# Patient Record
Sex: FEMALE | Race: BLACK OR AFRICAN AMERICAN | NOT HISPANIC OR LATINO | Employment: FULL TIME | URBAN - METROPOLITAN AREA
[De-identification: names, ages, dates, MRNs, and addresses within clinical notes are randomized per-mention and may not be internally consistent; named-entity substitution may affect disease eponyms.]

---

## 2023-05-25 ENCOUNTER — OFFICE VISIT (OUTPATIENT)
Dept: URGENT CARE | Age: 53
End: 2023-05-25

## 2023-05-25 VITALS
TEMPERATURE: 96.1 F | WEIGHT: 210 LBS | HEART RATE: 76 BPM | BODY MASS INDEX: 33.75 KG/M2 | OXYGEN SATURATION: 99 % | SYSTOLIC BLOOD PRESSURE: 140 MMHG | DIASTOLIC BLOOD PRESSURE: 84 MMHG | RESPIRATION RATE: 18 BRPM | HEIGHT: 66 IN

## 2023-05-25 DIAGNOSIS — H57.12 ACUTE LEFT EYE PAIN: Primary | ICD-10-CM

## 2023-05-25 DIAGNOSIS — H57.89 REDNESS OF LEFT EYE: ICD-10-CM

## 2023-05-25 PROBLEM — D25.9 FIBROID UTERUS: Status: ACTIVE | Noted: 2023-05-25

## 2023-05-25 NOTE — PROGRESS NOTES
[facility]        NAME: Keeley Simons is a 46 y o  female  : 1970    MRN: [id number]  DATE: May 25, 2023  TIME: 7:27 PM      Assessment and Plan     Acute left eye pain [H57 12]  1  Acute left eye pain  Transfer to other facility      2  Redness of left eye  Transfer to other facility          Patient agreeable to proceed to the ER for further evaluation- requesting to go to [facility]  Family at bedside  Patient Instructions     Proceed to the ER for further evaluation  Chief Complaint     Chief Complaint   Patient presents with   • Eye Swelling     Left eye started to itch and then swelled started today          History of Present Illness     Patient is a 65-year-old female who presents with left eye pain that started two hours ago  Reports a deep, severe pain to the eye  States it started acutely after dinner  Reports swelling to left eyelid  Denies visual disturbances  Denies fever  Denies headache or dizziness  Denies vomiting or diarrhea  Denies trauma to her eye  Denies use of contacts  Review of Systems     Review of Systems   Constitutional: Negative for chills and fever  Eyes: Positive for pain, redness and itching  Negative for discharge and visual disturbance  Gastrointestinal: Negative for diarrhea, nausea and vomiting  Neurological: Negative for headaches  All other systems reviewed and are negative  Current Medications     No current outpatient medications on file  Current Allergies     Allergies as of 2023 - Reviewed 2023   Allergen Reaction Noted   • Penicillins Rash 2013              The following portions of the patient's history were reviewed and updated as appropriate: allergies, current medications, past family history, past medical history, past social history, past surgical history and problem list      No past medical history on file  No past surgical history on file  No family history on file        Medications have been "verified  Objective     /84   Pulse 76   Temp (!) 96 1 °F (35 6 °C)   Resp 18   Ht 5' 6\" (1 676 m)   Wt 95 3 kg (210 lb)   SpO2 99%   BMI 33 89 kg/m²   No LMP recorded  Physical Exam     Physical Exam  Vitals and nursing note reviewed  Constitutional:       General: She is awake  She is not in acute distress  Appearance: Normal appearance  She is not ill-appearing, toxic-appearing or diaphoretic  Eyes:      General:         Right eye: No discharge  Left eye: Discharge (clear, watery) present  Conjunctiva/sclera:      Right eye: Right conjunctiva is not injected  Left eye: Left conjunctiva is injected  No chemosis, exudate or hemorrhage  Pupils: Pupils are equal, round, and reactive to light  Funduscopic exam:     Right eye: Red reflex present  Left eye: Red reflex present  Comments: Swelling to left upper and lower eyelid with associated redness  Cardiovascular:      Rate and Rhythm: Normal rate  Pulses: Normal pulses  Heart sounds: Normal heart sounds, S1 normal and S2 normal    Pulmonary:      Effort: Pulmonary effort is normal       Breath sounds: Normal breath sounds and air entry  Skin:     General: Skin is warm  Capillary Refill: Capillary refill takes less than 2 seconds  Neurological:      Mental Status: She is alert  Psychiatric:         Mood and Affect: Mood normal          Behavior: Behavior normal          Thought Content:  Thought content normal          Judgment: Judgment normal        "

## 2023-06-30 ENCOUNTER — HOSPITAL ENCOUNTER (OUTPATIENT)
Dept: RADIOLOGY | Facility: IMAGING CENTER | Age: 53
End: 2023-06-30
Payer: COMMERCIAL

## 2023-06-30 DIAGNOSIS — M25.571 PAIN, JOINT, ANKLE AND FOOT, RIGHT: ICD-10-CM

## 2023-06-30 PROCEDURE — 73610 X-RAY EXAM OF ANKLE: CPT

## 2023-08-03 ENCOUNTER — OFFICE VISIT (OUTPATIENT)
Dept: INTERNAL MEDICINE CLINIC | Facility: OTHER | Age: 53
End: 2023-08-03
Payer: COMMERCIAL

## 2023-08-03 VITALS
OXYGEN SATURATION: 98 % | BODY MASS INDEX: 34.78 KG/M2 | TEMPERATURE: 98.1 F | RESPIRATION RATE: 18 BRPM | HEIGHT: 66 IN | SYSTOLIC BLOOD PRESSURE: 124 MMHG | DIASTOLIC BLOOD PRESSURE: 82 MMHG | HEART RATE: 77 BPM | WEIGHT: 216.4 LBS

## 2023-08-03 DIAGNOSIS — Z00.00 ENCOUNTER FOR ANNUAL PHYSICAL EXAM: ICD-10-CM

## 2023-08-03 DIAGNOSIS — M79.605 PAIN IN LEFT LEG: Primary | ICD-10-CM

## 2023-08-03 PROCEDURE — 99202 OFFICE O/P NEW SF 15 MIN: CPT

## 2023-08-03 RX ORDER — PREDNISONE 20 MG/1
20 TABLET ORAL DAILY
COMMUNITY
Start: 2023-07-25

## 2023-08-03 NOTE — PROGRESS NOTES
[facility]  INTERNAL MEDICINE        NAME: Jo Guerrero  AGE: 46 y.o. SEX: female    DATE OF ENCOUNTER: 8/3/2023    ASSESSMENT AND PLAN     1. Pain in left leg  · Reports pain in entire left leg that has been going on for almost 2 years. Pain is intermittent, cramping and sometimes burning sensation but otherwise is not associated with any other systemic symptoms. Denies any trauma or fall. Mentions she had some " stress test" done while in [place] (report not available at this time). Denies any other systemic symptoms including leg swelling, fever, rash, discoloration. Has family history of arthritis. · On physical exam, some crepitus noted on bilateral knees but otherwise fairly normal physical findings. · Likely arthritis versus DVT or PVD. · Will order x-ray of left hip/pelvis and left knee to rule out arthritis. · Follow-up in 4 to 6 weeks. - XR hip/pelv 2-3 vws left if performed; Future  - XR knee 3 vw left non injury; Future    No orders of the defined types were placed in this encounter. CHIEF COMPLAINT     Chief Complaint   Patient presents with   • Establish Care   • hm     Mammo,( REFUSED ) cologuard ( REFUSED ), phq, annual, bmi f/u plan   • Leg Pain     Left leg continued pain, wakes her up at night did have testing yrs ago        HISTORY OF PRESENT ILLNESS     58-year-old female fairly healthy with no significant past medical history who presents today to the clinic to establish care. Today, she complains of pain in left leg that has been going on for almost 2 years. Reports pain in entire left leg that has been going on for almost 2 years. Pain is intermittent, cramping and sometimes burning sensation but otherwise is not associated with any other systemic symptoms. Denies any trauma or fall. Mentions she had some " stress test" done while in [place] (report not available at this time).   Denies any other systemic symptoms including leg swelling, fever, rash, discoloration. Has family history of arthritis. She otherwise feels in good health with no other physical complaints. She is following podiatry for right foot pain and had recent x-ray which showed spur. She recently received steroid injection to her right foot with much relief. The following portions of the patient's history were reviewed and updated as appropriate: allergies, current medications, past family history, past medical history, past social history, past surgical history and problem list.    REVIEW OF SYSTEMS     Review of Systems   Constitutional: Negative for chills and fever. HENT: Negative for congestion and sore throat. Cardiovascular: Negative for chest pain and dyspnea on exertion. Respiratory: Negative for cough and shortness of breath. Skin: Negative for color change, itching and unusual hair distribution. Musculoskeletal: Positive for muscle cramps. Gastrointestinal: Negative for abdominal pain, nausea and vomiting. Genitourinary: Negative for frequency and urgency. All other systems reviewed and are negative. All other ROS negative except per HPI    ACTIVE PROBLEM LIST     Patient Active Problem List   Diagnosis   • S/P hysterectomy   • Fibroid uterus       History reviewed. No pertinent past medical history. CURRENT MEDICATIONS       Current Outpatient Medications:   •  predniSONE 20 mg tablet, Take 20 mg by mouth daily, Disp: , Rfl:   •  Probiotic Product (PROBIOTIC DAILY PO), Take by mouth if needed, Disp: , Rfl:     Allergies   Allergen Reactions   • Penicillins Rash       Social History   History reviewed. No pertinent surgical history. Family History   Problem Relation Age of Onset   • Arthritis Mother    • Hypertension Father    • Asthma Father    • Glaucoma Father        OBJECTIVE     Resp 18   Ht 5' 5.5" (1.664 m)   Wt 98.2 kg (216 lb 6.4 oz)   BMI 35.46 kg/m²     Physical Exam  Vitals reviewed.    Constitutional:       Appearance: Normal appearance. She is obese. HENT:      Head: Normocephalic and atraumatic. Cardiovascular:      Rate and Rhythm: Normal rate and regular rhythm. Pulses: Normal pulses. Heart sounds: Normal heart sounds. Pulmonary:      Effort: Pulmonary effort is normal.      Breath sounds: Normal breath sounds. Abdominal:      General: Bowel sounds are normal.      Palpations: Abdomen is soft. Musculoskeletal:         General: No swelling or deformity. Normal range of motion. Right lower leg: No edema. Left lower leg: No edema. Neurological:      General: No focal deficit present. Mental Status: She is alert and oriented to person, place, and time. Pertinent Laboratory/Diagnostic Studies:     XR ankle 3+ vw right    Result Date: 7/9/2023  Impression: No acute osseous abnormality. Workstation performed: [id number]       Images and diagnostics reviewed     [address] [place] [address]   Topic Date Due   • Hepatitis C Screening  Never done   • COVID-19 Vaccine (1) Never done   • HIV Screening  Never done   • BMI: Followup Plan  Never done   • Annual Physical  Never done   • DTaP,Tdap,and Td Vaccines (1 - Tdap) Never done   • Breast Cancer Screening: Mammogram  Never done   • Colorectal Cancer Screening  Never done   • Influenza Vaccine (1) 09/01/2023   • BMI: Adult  05/25/2024   • Depression Screening  08/03/2024   • Pneumococcal Vaccine: Pediatrics (0 to 5 Years) and At-Risk Patients (6 to 59 Years)  Aged Out   • HIB Vaccine  Aged Out   • IPV Vaccine  Aged Out   • Hepatitis A Vaccine  Aged Out   • Meningococcal ACWY Vaccine  Aged Out   • HPV Vaccine  Aged Out       There is no immunization history on file for this patient. I globally spent 35 minutes face-to-face with the patient and with chart review.      [address]ik Mack,   Internal Medicine PGY-2

## 2024-01-11 ENCOUNTER — OFFICE VISIT (OUTPATIENT)
Dept: URGENT CARE | Age: 54
End: 2024-01-11
Payer: COMMERCIAL

## 2024-01-11 VITALS
HEART RATE: 77 BPM | BODY MASS INDEX: 35.23 KG/M2 | RESPIRATION RATE: 20 BRPM | DIASTOLIC BLOOD PRESSURE: 86 MMHG | SYSTOLIC BLOOD PRESSURE: 132 MMHG | WEIGHT: 215 LBS | TEMPERATURE: 97.5 F | OXYGEN SATURATION: 99 %

## 2024-01-11 DIAGNOSIS — M54.32 SCIATICA OF LEFT SIDE: Primary | ICD-10-CM

## 2024-01-11 PROCEDURE — 99213 OFFICE O/P EST LOW 20 MIN: CPT

## 2024-01-11 RX ORDER — PREDNISONE 20 MG/1
40 TABLET ORAL DAILY
Qty: 8 TABLET | Refills: 0 | Status: SHIPPED | OUTPATIENT
Start: 2024-01-11 | End: 2024-01-15

## 2024-01-11 RX ORDER — NAPROXEN 500 MG/1
500 TABLET ORAL 2 TIMES DAILY WITH MEALS
Qty: 14 TABLET | Refills: 0 | Status: SHIPPED | OUTPATIENT
Start: 2024-01-11 | End: 2024-01-18

## 2024-01-11 NOTE — PROGRESS NOTES
[facility]        NAME: Blanche Kennedy is a 53 y.o. female  : 1970    MRN: [id number]  DATE: 2024  TIME: 4:34 PM    Assessment and Plan   Sciatica of left side [M54.32]  1. Sciatica of left side  predniSONE 20 mg tablet    naproxen (Naprosyn) 500 mg tablet        Today started with left low back pain with radiation into left buttock- no injury/trauma. Discussed sciatic pain- PCP f/u    Patient Instructions       Follow up with PCP in 3-5 days.  Proceed to  ER if symptoms worsen.    Chief Complaint     Chief Complaint   Patient presents with    Sciatica     Pt started today with left sided buttock pain with radiation down left leg. Has numbness. Taking Tylenol  and applied Salon pas for pain.           History of Present Illness       Today started with left low back pain with radiation into left buttock- no injury/trauma. Discussed sciatic pain- PCP f/u        Review of Systems   Review of Systems   Musculoskeletal:  Positive for back pain.         Current Medications       Current Outpatient Medications:     naproxen (Naprosyn) 500 mg tablet, Take 1 tablet (500 mg total) by mouth 2 (two) times a day with meals for 7 days, Disp: 14 tablet, Rfl: 0    predniSONE 20 mg tablet, Take 2 tablets (40 mg total) by mouth daily for 4 days, Disp: 8 tablet, Rfl: 0    Probiotic Product (PROBIOTIC DAILY PO), Take by mouth if needed (Patient not taking: Reported on 2024), Disp: , Rfl:     Current Allergies     Allergies as of 2024 - Reviewed 2024   Allergen Reaction Noted    Penicillins Rash 2013            The following portions of the patient's history were reviewed and updated as appropriate: allergies, current medications, past family history, past medical history, past social history, past surgical history and problem list.     History reviewed. No pertinent past medical history.    History reviewed. No pertinent surgical history.    Family History   Problem Relation Age of  Onset    Arthritis Mother     Hypertension Father     Asthma Father     Glaucoma Father          Medications have been verified.        Objective   /86   Pulse 77   Temp 97.5 °F (36.4 °C) (Tympanic)   Resp 20   Wt 97.5 kg (215 lb)   SpO2 99%   BMI 35.23 kg/m²   No LMP recorded. Patient has had a hysterectomy.       Physical Exam     Physical Exam  Vitals reviewed.   Constitutional:       Appearance: Normal appearance.   Musculoskeletal:         General: Tenderness present. No deformity or signs of injury.   Skin:     Findings: No bruising or erythema.   Neurological:      Mental Status: She is alert.

## 2024-01-11 NOTE — LETTER
January 11, 2024     Patient: Blanche Kennedy   YOB: 1970   Date of Visit: 1/11/2024       To Whom it May Concern:    Blanche Kennedy was seen in my clinic on 1/11/2024. She may return to work on 01/12/2024 .    If you have any questions or concerns, please don't hesitate to call.         Sincerely,          Gonzalo Harrison,         CC: No Recipients

## 2024-02-05 ENCOUNTER — HOSPITAL ENCOUNTER (OUTPATIENT)
Dept: RADIOLOGY | Facility: IMAGING CENTER | Age: 54
Discharge: HOME | End: 2024-02-05
Payer: COMMERCIAL

## 2024-02-05 ENCOUNTER — OFFICE VISIT (OUTPATIENT)
Dept: INTERNAL MEDICINE CLINIC | Facility: OTHER | Age: 54
End: 2024-02-05
Payer: COMMERCIAL

## 2024-02-05 VITALS
SYSTOLIC BLOOD PRESSURE: 110 MMHG | HEART RATE: 84 BPM | WEIGHT: 221.2 LBS | HEIGHT: 66 IN | DIASTOLIC BLOOD PRESSURE: 80 MMHG | TEMPERATURE: 98 F | BODY MASS INDEX: 35.55 KG/M2 | OXYGEN SATURATION: 97 %

## 2024-02-05 DIAGNOSIS — G89.29 CHRONIC MIDLINE LOW BACK PAIN WITH LEFT-SIDED SCIATICA: ICD-10-CM

## 2024-02-05 DIAGNOSIS — Z12.11 ENCOUNTER FOR COLORECTAL CANCER SCREENING: ICD-10-CM

## 2024-02-05 DIAGNOSIS — M54.42 ACUTE MIDLINE LOW BACK PAIN WITH LEFT-SIDED SCIATICA: ICD-10-CM

## 2024-02-05 DIAGNOSIS — E78.2 MIXED HYPERLIPIDEMIA: Primary | ICD-10-CM

## 2024-02-05 DIAGNOSIS — M54.42 CHRONIC MIDLINE LOW BACK PAIN WITH LEFT-SIDED SCIATICA: ICD-10-CM

## 2024-02-05 DIAGNOSIS — Z13.820 OSTEOPOROSIS SCREENING: ICD-10-CM

## 2024-02-05 DIAGNOSIS — Z12.31 ENCOUNTER FOR SCREENING MAMMOGRAM FOR MALIGNANT NEOPLASM OF BREAST: ICD-10-CM

## 2024-02-05 DIAGNOSIS — Z12.12 ENCOUNTER FOR COLORECTAL CANCER SCREENING: ICD-10-CM

## 2024-02-05 PROCEDURE — 99214 OFFICE O/P EST MOD 30 MIN: CPT | Performed by: INTERNAL MEDICINE

## 2024-02-05 PROCEDURE — 72110 X-RAY EXAM L-2 SPINE 4/>VWS: CPT

## 2024-02-05 NOTE — ASSESSMENT & PLAN NOTE
Continue follow-up with chiropractor and continue acupuncture therapy.  Will order an x-ray of the lumbar spine for further evaluation.

## 2024-02-05 NOTE — PROGRESS NOTES
Assessment/Plan:    Chronic midline low back pain with left-sided sciatica  Continue follow-up with chiropractor and continue acupuncture therapy.  Will order an x-ray of the lumbar spine for further evaluation.    Mixed hyperlipidemia  Discussed diet and exercise.  Continue to trend lipid panel.       Diagnoses and all orders for this visit:    Mixed hyperlipidemia  -     CBC; Future  -     Comprehensive metabolic panel; Future  -     Lipid panel; Future    Encounter for screening mammogram for malignant neoplasm of breast  -     Mammo screening bilateral w 3d & cad; Future    Encounter for colorectal cancer screening  -     Ambulatory Referral to Gastroenterology; Future    Osteoporosis screening  -     DXA bone density spine hip and pelvis; Future    Acute midline low back pain with left-sided sciatica  -     XR spine lumbar minimum 4 views non injury; Future    Chronic midline low back pain with left-sided sciatica                  Subjective:      Patient ID: Blanche Kennedy is a 53 y.o. female.    Chief Complaint   Patient presents with   • Follow-up     6 month follow up. Patient has been having left leg pain burning and throbbing starts in upper thigh radiates  down leg   • Health Screening     Colonoscopy  ordered mammogram ordered patient will call to schedule dexa scan ordered       Blanche Kennedy is seen today for follow up of chronic conditions.   Recent laboratory studies reviewed today with the patient.   she has been compliant with her medication regimen.   She continues to experiencing left leg pain that occurs predominantly at night when asleep and has been present for years. She sleeps on her stomach. The pain is characterized as thrombing  and burning.  She recently had an exacerbation of low back pain to which she has been seeing acupuncture therapist and chiropractor.  she has no complaints or concerns at this time.       Back Pain  This is a new problem. The current episode started 1 to 4 weeks  ago. The problem occurs intermittently. Pertinent negatives include no abdominal pain, chest pain, dysuria, fever, headaches, numbness or weakness.     The following portions of the patient's history were reviewed and updated as appropriate: allergies, current medications, past family history, past medical history, past social history, past surgical history, and problem list.    Review of Systems   Constitutional:  Negative for activity change, appetite change, chills, diaphoresis, fatigue and fever.   HENT:  Negative for congestion, postnasal drip, rhinorrhea, sinus pressure, sinus pain, sneezing and sore throat.    Eyes:  Negative for visual disturbance.   Respiratory:  Negative for apnea, cough, choking, chest tightness, shortness of breath and wheezing.    Cardiovascular:  Negative for chest pain, palpitations and leg swelling.   Gastrointestinal:  Negative for abdominal distention, abdominal pain, anal bleeding, blood in stool, constipation, diarrhea, nausea and vomiting.   Endocrine: Negative for cold intolerance and heat intolerance.   Genitourinary:  Negative for difficulty urinating, dysuria and hematuria.   Musculoskeletal:  Positive for back pain.   Skin: Negative.    Neurological:  Negative for dizziness, weakness, light-headedness, numbness and headaches.   Hematological:  Negative for adenopathy.   Psychiatric/Behavioral:  Negative for agitation, sleep disturbance and suicidal ideas.    All other systems reviewed and are negative.        History reviewed. No pertinent past medical history.    No current outpatient medications on file.    Allergies   Allergen Reactions   • Penicillins Rash       Social History   History reviewed. No pertinent surgical history.  Family History   Problem Relation Age of Onset   • Arthritis Mother    • Hypertension Father    • Asthma Father    • Glaucoma Father        Objective:  /80 (BP Location: Left arm, Patient Position: Sitting, Cuff Size: Large)   Pulse 84    "Temp 98 °F (36.7 °C) (Temporal)   Ht 5' 6\" (1.676 m)   Wt 100 kg (221 lb 3.2 oz)   SpO2 97%   BMI 35.70 kg/m²     No results found for this or any previous visit (from the past 1344 hour(s)).         Physical Exam  Vitals and nursing note reviewed.   Constitutional:       General: She is not in acute distress.     Appearance: She is well-developed. She is not diaphoretic.   HENT:      Head: Normocephalic and atraumatic.   Eyes:      General:         Right eye: No discharge.         Left eye: No discharge.      Conjunctiva/sclera: Conjunctivae normal.      Pupils: Pupils are equal, round, and reactive to light.   Neck:      Thyroid: No thyromegaly.      Vascular: No JVD.   Cardiovascular:      Rate and Rhythm: Normal rate and regular rhythm.      Heart sounds: Normal heart sounds. No murmur heard.     No friction rub. No gallop.   Pulmonary:      Effort: Pulmonary effort is normal. No respiratory distress.      Breath sounds: Normal breath sounds. No wheezing or rales.   Chest:      Chest wall: No tenderness.   Abdominal:      General: There is no distension.      Palpations: Abdomen is soft.      Tenderness: There is no abdominal tenderness.   Musculoskeletal:         General: No tenderness or deformity. Normal range of motion.      Cervical back: Normal range of motion and neck supple.   Lymphadenopathy:      Cervical: No cervical adenopathy.   Skin:     General: Skin is warm and dry.      Coloration: Skin is not pale.      Findings: No erythema or rash.   Neurological:      Mental Status: She is alert and oriented to person, place, and time.      Cranial Nerves: No cranial nerve deficit.      Coordination: Coordination normal.   Psychiatric:         Behavior: Behavior normal.         Thought Content: Thought content normal.         Judgment: Judgment normal.         "

## 2024-02-16 ENCOUNTER — TELEPHONE (OUTPATIENT)
Age: 54
End: 2024-02-16

## 2024-02-16 NOTE — TELEPHONE ENCOUNTER
Scheduled date of colonoscopy (as of today): Monday 4/15/2024  Physician performing colonoscopy:  DR. NDIAYE  Location of colonoscopy: [facility]  Bowel prep reviewed with patient: MIRALAX / DULCOLAX  Instructions reviewed with patient by: Dyllan MAYER / Sent via e-mail  Clearances: N/A

## 2024-02-16 NOTE — TELEPHONE ENCOUNTER
02/16/24  Screened by: Rin Fisher    Referring Provider PCP    Pre- Screening:     There is no height or weight on file to calculate BMI.  Has patient been referred for a routine screening Colonoscopy? yes  Is the patient between 45-75 years old? yes      Previous Colonoscopy yes   If yes:    Date: 10yrs    Facility: [place]    Reason: Screening      Does the patient want to see a Gastroenterologist prior to their procedure OR are they having any GI symptoms? no    Has the patient been hospitalized or had abdominal surgery in the past 6 months? no    Does the patient use supplemental oxygen? no    Does the patient take Coumadin, Lovenox, Plavix, Elliquis, Xarelto, or other blood thinning medication? no    Has the patient had a stroke, cardiac event, or stent placed in the past year? no      If patient is between 45yrs - 49yrs, please advise patient that we will have to confirm benefits & coverage with their insurance company for a routine screening colonoscopy.

## 2024-04-01 ENCOUNTER — ANESTHESIA (OUTPATIENT)
Dept: ANESTHESIOLOGY | Facility: HOSPITAL | Age: 54
End: 2024-04-01

## 2024-04-01 ENCOUNTER — ANESTHESIA EVENT (OUTPATIENT)
Dept: ANESTHESIOLOGY | Facility: HOSPITAL | Age: 54
End: 2024-04-01

## 2024-04-15 ENCOUNTER — HOSPITAL ENCOUNTER (OUTPATIENT)
Dept: GASTROENTEROLOGY | Facility: AMBULARY SURGERY CENTER | Age: 54
Setting detail: OUTPATIENT SURGERY
Discharge: HOME | End: 2024-04-15
Attending: INTERNAL MEDICINE | Admitting: INTERNAL MEDICINE
Payer: COMMERCIAL

## 2024-04-15 ENCOUNTER — TELEPHONE (OUTPATIENT)
Dept: GASTROENTEROLOGY | Facility: CLINIC | Age: 54
End: 2024-04-15

## 2024-04-15 ENCOUNTER — PREP FOR PROCEDURE (OUTPATIENT)
Dept: GASTROENTEROLOGY | Facility: AMBULARY SURGERY CENTER | Age: 54
End: 2024-04-15

## 2024-04-15 ENCOUNTER — ANESTHESIA EVENT (OUTPATIENT)
Dept: GASTROENTEROLOGY | Facility: AMBULARY SURGERY CENTER | Age: 54
End: 2024-04-15

## 2024-04-15 ENCOUNTER — ANESTHESIA (OUTPATIENT)
Dept: GASTROENTEROLOGY | Facility: AMBULARY SURGERY CENTER | Age: 54
End: 2024-04-15

## 2024-04-15 VITALS
OXYGEN SATURATION: 100 % | HEART RATE: 76 BPM | TEMPERATURE: 97.9 F | WEIGHT: 221 LBS | BODY MASS INDEX: 35.52 KG/M2 | HEIGHT: 66 IN | DIASTOLIC BLOOD PRESSURE: 69 MMHG | SYSTOLIC BLOOD PRESSURE: 117 MMHG | RESPIRATION RATE: 18 BRPM

## 2024-04-15 DIAGNOSIS — Z12.11 SCREENING FOR COLON CANCER: Primary | ICD-10-CM

## 2024-04-15 DIAGNOSIS — Z12.11 SCREENING FOR COLON CANCER: ICD-10-CM

## 2024-04-15 RX ORDER — PROPOFOL 10 MG/ML
INJECTION, EMULSION INTRAVENOUS AS NEEDED
Status: DISCONTINUED | OUTPATIENT
Start: 2024-04-15 | End: 2024-04-15

## 2024-04-15 RX ORDER — LIDOCAINE HYDROCHLORIDE 10 MG/ML
INJECTION, SOLUTION EPIDURAL; INFILTRATION; INTRACAUDAL; PERINEURAL AS NEEDED
Status: DISCONTINUED | OUTPATIENT
Start: 2024-04-15 | End: 2024-04-15

## 2024-04-15 RX ORDER — SODIUM CHLORIDE, SODIUM LACTATE, POTASSIUM CHLORIDE, CALCIUM CHLORIDE 600; 310; 30; 20 MG/100ML; MG/100ML; MG/100ML; MG/100ML
INJECTION, SOLUTION INTRAVENOUS CONTINUOUS PRN
Status: DISCONTINUED | OUTPATIENT
Start: 2024-04-15 | End: 2024-04-15

## 2024-04-15 RX ADMIN — LIDOCAINE HYDROCHLORIDE 50 MG: 10 INJECTION, SOLUTION EPIDURAL; INFILTRATION; INTRACAUDAL at 07:39

## 2024-04-15 RX ADMIN — SODIUM CHLORIDE, SODIUM LACTATE, POTASSIUM CHLORIDE, AND CALCIUM CHLORIDE: .6; .31; .03; .02 INJECTION, SOLUTION INTRAVENOUS at 07:33

## 2024-04-15 RX ADMIN — PROPOFOL 100 MG: 10 INJECTION, EMULSION INTRAVENOUS at 07:41

## 2024-04-15 RX ADMIN — PROPOFOL 50 MG: 10 INJECTION, EMULSION INTRAVENOUS at 07:45

## 2024-04-15 RX ADMIN — PROPOFOL 50 MG: 10 INJECTION, EMULSION INTRAVENOUS at 07:42

## 2024-04-15 RX ADMIN — PROPOFOL 150 MG: 10 INJECTION, EMULSION INTRAVENOUS at 07:39

## 2024-04-15 NOTE — ANESTHESIA PREPROCEDURE EVALUATION
Procedure:  COLONOSCOPY    Relevant Problems   CARDIO   (+) Mixed hyperlipidemia      MUSCULOSKELETAL   (+) Chronic midline low back pain with left-sided sciatica      NEURO/PSYCH   (+) Chronic midline low back pain with left-sided sciatica        Physical Exam    Airway    Mallampati score: II  TM Distance: >3 FB  Neck ROM: full     Dental   No notable dental hx     Cardiovascular  Rhythm: regular, Rate: normal    Pulmonary   Breath sounds clear to auscultation    Other Findings  post-pubertal.      Anesthesia Plan  ASA Score- 2     Anesthesia Type- IV sedation with anesthesia with ASA Monitors.         Additional Monitors:     Airway Plan:            Plan Factors-Exercise tolerance (METS): >4 METS.    Chart reviewed.   Existing labs reviewed. Patient summary reviewed.    Patient is not a current smoker.              Induction- intravenous.    Postoperative Plan-     Informed Consent- Anesthetic plan and risks discussed with patient.  I personally reviewed this patient with the CRNA. Discussed and agreed on the Anesthesia Plan with the CRNA..

## 2024-04-15 NOTE — TELEPHONE ENCOUNTER
----- Message from De Collier MD sent at 4/15/2024  8:36 AM EDT -----  Regarding: Repeat colonoscopy  Good morning!    Blanche was here for colonoscopy today. Her bowel prep was poor. Please schedule her for repeat colonoscopy in 3 months. She needs 2 days prep per protocol.    Thank you    De

## 2024-04-15 NOTE — ANESTHESIA POSTPROCEDURE EVALUATION
Post-Op Assessment Note    CV Status:  Stable  Pain Score: 0    Pain management: adequate       Mental Status:  Arousable and sleepy   Hydration Status:  Euvolemic   PONV Controlled:  Controlled   Airway Patency:  Patent     Post Op Vitals Reviewed: Yes    No anethesia notable event occurred.    Staff: CRNA               /78 (04/15/24 0750)    Temp 97.9 °F (36.6 °C) (04/15/24 0750)    Pulse 89 (04/15/24 0750)   Resp 18 (04/15/24 0750)    SpO2 96 % (04/15/24 0750)

## 2024-04-15 NOTE — H&P
"History and Physical -  [facility]  Blanche Kennedy 54 y.o. female MRN: [id number]    HPI: Blanche Kennedy is a 54 y.o. year old female who presents for screening colonoscopy      Review of Systems    Historical Information   Past Medical History:   Diagnosis Date    Colon polyp      Past Surgical History:   Procedure Laterality Date    COLONOSCOPY       Social History   Social History     Substance and Sexual Activity   Alcohol Use Never     Social History     Substance and Sexual Activity   Drug Use Never     Social History     Tobacco Use   Smoking Status Never   Smokeless Tobacco Never     Family History   Problem Relation Age of Onset    Arthritis Mother     Hypertension Father     Asthma Father     Glaucoma Father        Meds/Allergies     (Not in a hospital admission)      Allergies   Allergen Reactions    Penicillins Rash       Objective     /81   Pulse 77   Temp (!) 97.4 °F (36.3 °C) (Temporal)   Resp 19   Ht 5' 6\" (1.676 m)   Wt 100 kg (221 lb)   SpO2 100%   BMI 35.67 kg/m²       PHYSICAL EXAM    Gen: NAD  CV: RRR  CHEST: Clear  ABD: soft, NT/ND  EXT: no edema  Neuro: AAO      ASSESSMENT/PLAN:  This is a 54 y.o. year old female here for colonoscopy    PLAN:   Procedure: colonoscopy      "

## 2024-04-16 ENCOUNTER — TELEPHONE (OUTPATIENT)
Age: 54
End: 2024-04-16

## 2024-04-16 NOTE — TELEPHONE ENCOUNTER
PA for Golytely    Submitted via    []CMM-KEY   [x]Shannon-Case ID #   Case ID: [id number]     []Faxed to plan  []Other website   []Phone call Case ID #     Office notes sent, clinical questions answered. Awaiting determination    Turnaround time for your insurance to make a decision on your Prior Authorization can take 7-21 business days.

## 2024-04-16 NOTE — TELEPHONE ENCOUNTER
Forwarding to PA team for review.   Patient needs prior authorization for Golytely 4000 ml solution  Dr. Reed Blair

## 2024-04-17 NOTE — TELEPHONE ENCOUNTER
Agree with Carol Vasquez, sounds like a new prescription will need to be submitted to pharmacy in May

## 2024-04-17 NOTE — TELEPHONE ENCOUNTER
PA for [id number] Denied     Reason:(Screenshot if applicable)    Note to provider: IN original PA request it was stated pt had incomplete colonoscopy due to poor bowel prep...        Message sent to office clinical pool Yes    Denial letter scanned into Media Yes    Appeal started No ( Provider will need to decide if appeal is warranted and send clinical documentation to PA team for initiation.)

## 2024-04-22 ENCOUNTER — TRANSCRIBE ORDERS (OUTPATIENT)
Dept: GASTROENTEROLOGY | Facility: CLINIC | Age: 54
End: 2024-04-22

## 2024-07-01 ENCOUNTER — ANESTHESIA EVENT (OUTPATIENT)
Dept: ANESTHESIOLOGY | Facility: HOSPITAL | Age: 54
End: 2024-07-01

## 2024-07-01 ENCOUNTER — ANESTHESIA (OUTPATIENT)
Dept: ANESTHESIOLOGY | Facility: HOSPITAL | Age: 54
End: 2024-07-01

## 2024-07-22 ENCOUNTER — OFFICE VISIT (OUTPATIENT)
Age: 54
End: 2024-07-22
Payer: COMMERCIAL

## 2024-07-22 VITALS
WEIGHT: 229 LBS | HEIGHT: 66 IN | DIASTOLIC BLOOD PRESSURE: 84 MMHG | BODY MASS INDEX: 36.8 KG/M2 | OXYGEN SATURATION: 99 % | TEMPERATURE: 98 F | HEART RATE: 75 BPM | SYSTOLIC BLOOD PRESSURE: 122 MMHG

## 2024-07-22 DIAGNOSIS — L25.9 CONTACT DERMATITIS, UNSPECIFIED CONTACT DERMATITIS TYPE, UNSPECIFIED TRIGGER: ICD-10-CM

## 2024-07-22 DIAGNOSIS — E78.5 HYPERLIPIDEMIA, UNSPECIFIED HYPERLIPIDEMIA TYPE: ICD-10-CM

## 2024-07-22 DIAGNOSIS — Z13.228 SCREENING FOR METABOLIC DISORDER: Primary | ICD-10-CM

## 2024-07-22 DIAGNOSIS — Z90.710 S/P HYSTERECTOMY: ICD-10-CM

## 2024-07-22 DIAGNOSIS — E03.9 HYPOTHYROIDISM, UNSPECIFIED TYPE: ICD-10-CM

## 2024-07-22 DIAGNOSIS — R79.89 ABNORMAL TSH: ICD-10-CM

## 2024-07-22 PROCEDURE — 99213 OFFICE O/P EST LOW 20 MIN: CPT | Performed by: NURSE PRACTITIONER

## 2024-07-22 RX ORDER — TRIAMCINOLONE ACETONIDE 5 MG/G
CREAM TOPICAL 3 TIMES DAILY
Qty: 15 G | Refills: 0 | Status: SHIPPED | OUTPATIENT
Start: 2024-07-22 | End: 2024-08-05

## 2024-07-22 NOTE — PROGRESS NOTES
Assessment/Plan:    Contact dermatitis  -Will start triamcinolone cream  -advised to start OTC antihistamine   -Advised to stay well moisturized  -Avoid irritants  -Return to office if no relief of symptoms or worsening of symptoms              Diagnoses and all orders for this visit:    Screening for metabolic disorder  -     Comprehensive metabolic panel; Future  -     CBC and differential  -     Lipid panel    Hyperlipidemia, unspecified hyperlipidemia type  -     Lipid panel    Abnormal TSH  -     TSH, 3rd generation with Free T4 reflex    Hypothyroidism, unspecified type  -     TSH, 3rd generation with Free T4 reflex    S/P hysterectomy  -     Iron Panel (Includes Ferritin, Iron Sat%, Iron, and TIBC); Future    Contact dermatitis, unspecified contact dermatitis type, unspecified trigger  -     triamcinolone (KENALOG) 0.5 % cream; Apply topically 3 (three) times a day for 14 days          Subjective:      Patient ID: Blanche Kennedy is a 54 y.o. female.    Pt presents to office with rash to L foot starting 2 weeks ago and has spread to B/L hands and back of neck. Itchy, has tried hydrocortisone with no relief.     No new medications, exposure to pets or sick contacts.     Rash  This is a new problem. The current episode started 1 to 4 weeks ago. The problem has been gradually worsening since onset. The affected locations include the left foot, right hand and left hand. The problem is mild. She was exposed to nothing. The rash first occurred at home. Associated symptoms include itching. Pertinent negatives include no congestion, cough, diarrhea, facial edema, fatigue, fever, rhinorrhea, shortness of breath, sore throat or vomiting. Past treatments include topical steroids. The treatment provided no relief. Her past medical history is significant for allergies. There were no sick contacts.       The following portions of the patient's history were reviewed and updated as appropriate: allergies, current  "medications, past family history, past medical history, past social history, past surgical history, and problem list.    Review of Systems   Constitutional:  Negative for activity change, appetite change, chills, diaphoresis, fatigue and fever.   HENT:  Negative for congestion, ear discharge, ear pain, postnasal drip, rhinorrhea, sinus pressure, sinus pain and sore throat.    Eyes:  Negative for pain, discharge, itching and visual disturbance.   Respiratory:  Negative for cough, chest tightness, shortness of breath and wheezing.    Cardiovascular:  Negative for chest pain, palpitations and leg swelling.   Gastrointestinal:  Negative for abdominal pain, constipation, diarrhea, nausea and vomiting.   Endocrine: Negative for polydipsia, polyphagia and polyuria.   Genitourinary:  Negative for difficulty urinating, dysuria and urgency.   Musculoskeletal:  Negative for arthralgias, back pain and neck pain.   Skin:  Positive for itching and rash. Negative for wound.   Neurological:  Negative for dizziness, weakness, numbness and headaches.         Past Medical History:   Diagnosis Date    Colon polyp          Current Outpatient Medications:     triamcinolone (KENALOG) 0.5 % cream, Apply topically 3 (three) times a day for 14 days, Disp: 15 g, Rfl: 0    polyethylene glycol (GOLYTELY) 4000 mL solution, Take 4,000 mL by mouth in the morning for 2 doses (Patient not taking: Reported on 7/22/2024), Disp: 8000 mL, Rfl: 0    Allergies   Allergen Reactions    Penicillins Rash       Social History   Past Surgical History:   Procedure Laterality Date    COLONOSCOPY       Family History   Problem Relation Age of Onset    Arthritis Mother     Hypertension Father     Asthma Father     Glaucoma Father        Objective:  /84 (BP Location: Left arm, Patient Position: Sitting, Cuff Size: Standard)   Pulse 75   Temp 98 °F (36.7 °C) (Temporal)   Ht 5' 5.98\" (1.676 m)   Wt 104 kg (229 lb)   SpO2 99%   BMI 36.98 kg/m²              " Physical Exam  Constitutional:       General: She is not in acute distress.     Appearance: She is well-developed. She is not diaphoretic.   HENT:      Head: Normocephalic and atraumatic.      Right Ear: External ear normal.      Left Ear: External ear normal.      Nose: Nose normal.      Mouth/Throat:      Mouth: Mucous membranes are moist.      Pharynx: No oropharyngeal exudate or posterior oropharyngeal erythema.   Eyes:      General:         Right eye: No discharge.         Left eye: No discharge.      Conjunctiva/sclera: Conjunctivae normal.      Pupils: Pupils are equal, round, and reactive to light.   Neck:      Thyroid: No thyromegaly.   Cardiovascular:      Rate and Rhythm: Normal rate and regular rhythm.      Heart sounds: Normal heart sounds. No murmur heard.     No friction rub. No gallop.   Pulmonary:      Effort: Pulmonary effort is normal. No respiratory distress.      Breath sounds: Normal breath sounds. No stridor. No wheezing or rales.   Abdominal:      General: Bowel sounds are normal. There is no distension.      Palpations: Abdomen is soft.      Tenderness: There is no abdominal tenderness.   Musculoskeletal:      Cervical back: Normal range of motion and neck supple.   Lymphadenopathy:      Cervical: No cervical adenopathy.   Skin:     General: Skin is warm and dry.      Findings: No erythema or rash.      Comments: Skin dryness noted   Vesicular rash noted to left foot, B/L hands and right side of neck in the back    Neurological:      Mental Status: She is alert and oriented to person, place, and time.   Psychiatric:         Behavior: Behavior normal.         Thought Content: Thought content normal.         Judgment: Judgment normal.

## 2024-07-22 NOTE — ASSESSMENT & PLAN NOTE
-Will start triamcinolone cream  -advised to start OTC antihistamine   -Advised to stay well moisturized  -Avoid irritants  -Return to office if no relief of symptoms or worsening of symptoms

## 2024-07-23 ENCOUNTER — APPOINTMENT (OUTPATIENT)
Dept: LAB | Facility: IMAGING CENTER | Age: 54
End: 2024-07-23
Payer: COMMERCIAL

## 2024-07-23 DIAGNOSIS — E78.2 MIXED HYPERLIPIDEMIA: ICD-10-CM

## 2024-07-23 DIAGNOSIS — Z90.710 S/P HYSTERECTOMY: ICD-10-CM

## 2024-07-23 DIAGNOSIS — Z13.228 SCREENING FOR METABOLIC DISORDER: ICD-10-CM

## 2024-07-23 LAB
ALBUMIN SERPL BCG-MCNC: 4.1 G/DL (ref 3.5–5)
ALP SERPL-CCNC: 99 U/L (ref 34–104)
ALT SERPL W P-5'-P-CCNC: 13 U/L (ref 7–52)
ANION GAP SERPL CALCULATED.3IONS-SCNC: 10 MMOL/L (ref 4–13)
AST SERPL W P-5'-P-CCNC: 16 U/L (ref 13–39)
BASOPHILS # BLD AUTO: 0.07 THOUSANDS/ÂΜL (ref 0–0.1)
BASOPHILS NFR BLD AUTO: 1 % (ref 0–1)
BILIRUB SERPL-MCNC: 0.83 MG/DL (ref 0.2–1)
BUN SERPL-MCNC: 12 MG/DL (ref 5–25)
CALCIUM SERPL-MCNC: 9.6 MG/DL (ref 8.4–10.2)
CHLORIDE SERPL-SCNC: 102 MMOL/L (ref 96–108)
CHOLEST SERPL-MCNC: 227 MG/DL
CO2 SERPL-SCNC: 30 MMOL/L (ref 21–32)
CREAT SERPL-MCNC: 0.81 MG/DL (ref 0.6–1.3)
EOSINOPHIL # BLD AUTO: 0.27 THOUSAND/ÂΜL (ref 0–0.61)
EOSINOPHIL NFR BLD AUTO: 4 % (ref 0–6)
ERYTHROCYTE [DISTWIDTH] IN BLOOD BY AUTOMATED COUNT: 13.2 % (ref 11.6–15.1)
FERRITIN SERPL-MCNC: 114 NG/ML (ref 11–307)
GFR SERPL CREATININE-BSD FRML MDRD: 82 ML/MIN/1.73SQ M
GLUCOSE P FAST SERPL-MCNC: 124 MG/DL (ref 65–99)
HCT VFR BLD AUTO: 44 % (ref 34.8–46.1)
HDLC SERPL-MCNC: 60 MG/DL
HGB BLD-MCNC: 14.1 G/DL (ref 11.5–15.4)
IMM GRANULOCYTES # BLD AUTO: 0.02 THOUSAND/UL (ref 0–0.2)
IMM GRANULOCYTES NFR BLD AUTO: 0 % (ref 0–2)
IRON SATN MFR SERPL: 38 % (ref 15–50)
IRON SERPL-MCNC: 118 UG/DL (ref 50–212)
LDLC SERPL CALC-MCNC: 148 MG/DL (ref 0–100)
LYMPHOCYTES # BLD AUTO: 2.87 THOUSANDS/ÂΜL (ref 0.6–4.47)
LYMPHOCYTES NFR BLD AUTO: 38 % (ref 14–44)
MCH RBC QN AUTO: 29.3 PG (ref 26.8–34.3)
MCHC RBC AUTO-ENTMCNC: 32 G/DL (ref 31.4–37.4)
MCV RBC AUTO: 91 FL (ref 82–98)
MONOCYTES # BLD AUTO: 0.64 THOUSAND/ÂΜL (ref 0.17–1.22)
MONOCYTES NFR BLD AUTO: 8 % (ref 4–12)
NEUTROPHILS # BLD AUTO: 3.75 THOUSANDS/ÂΜL (ref 1.85–7.62)
NEUTS SEG NFR BLD AUTO: 49 % (ref 43–75)
NONHDLC SERPL-MCNC: 167 MG/DL
NRBC BLD AUTO-RTO: 0 /100 WBCS
PLATELET # BLD AUTO: 196 THOUSANDS/UL (ref 149–390)
PMV BLD AUTO: 11.7 FL (ref 8.9–12.7)
POTASSIUM SERPL-SCNC: 4.4 MMOL/L (ref 3.5–5.3)
PROT SERPL-MCNC: 7.5 G/DL (ref 6.4–8.4)
RBC # BLD AUTO: 4.82 MILLION/UL (ref 3.81–5.12)
SODIUM SERPL-SCNC: 142 MMOL/L (ref 135–147)
TIBC SERPL-MCNC: 308 UG/DL (ref 250–450)
TRIGL SERPL-MCNC: 96 MG/DL
TSH SERPL DL<=0.05 MIU/L-ACNC: 3.54 UIU/ML (ref 0.45–4.5)
UIBC SERPL-MCNC: 190 UG/DL (ref 155–355)
WBC # BLD AUTO: 7.62 THOUSAND/UL (ref 4.31–10.16)

## 2024-07-23 PROCEDURE — 80053 COMPREHEN METABOLIC PANEL: CPT

## 2024-07-23 PROCEDURE — 84443 ASSAY THYROID STIM HORMONE: CPT | Performed by: NURSE PRACTITIONER

## 2024-07-23 PROCEDURE — 36415 COLL VENOUS BLD VENIPUNCTURE: CPT

## 2024-07-23 PROCEDURE — 82728 ASSAY OF FERRITIN: CPT

## 2024-07-23 PROCEDURE — 85025 COMPLETE CBC W/AUTO DIFF WBC: CPT | Performed by: NURSE PRACTITIONER

## 2024-07-23 PROCEDURE — 83550 IRON BINDING TEST: CPT

## 2024-07-23 PROCEDURE — 83540 ASSAY OF IRON: CPT

## 2024-07-23 PROCEDURE — 80061 LIPID PANEL: CPT | Performed by: NURSE PRACTITIONER

## 2024-08-02 ENCOUNTER — TELEPHONE (OUTPATIENT)
Dept: GASTROENTEROLOGY | Facility: AMBULARY SURGERY CENTER | Age: 54
End: 2024-08-02

## 2024-08-02 NOTE — TELEPHONE ENCOUNTER
----- Message from Katheryn M sent at 7/29/2024 10:34 AM EDT -----  Regarding: Reschedule colonoscopy  Please reach out to patient to reschedule colonoscopy. Thank you.  Patient was previously scheduled with Dr. Almazan but Dr. Collier did last colonoscopy and [facility]

## 2024-08-05 ENCOUNTER — HOSPITAL ENCOUNTER (OUTPATIENT)
Dept: RADIOLOGY | Age: 54
Discharge: HOME | End: 2024-08-05
Payer: COMMERCIAL

## 2024-08-05 VITALS — HEIGHT: 66 IN | BODY MASS INDEX: 36.8 KG/M2 | WEIGHT: 229 LBS

## 2024-08-05 DIAGNOSIS — Z12.31 ENCOUNTER FOR SCREENING MAMMOGRAM FOR MALIGNANT NEOPLASM OF BREAST: ICD-10-CM

## 2024-08-05 PROCEDURE — 77063 BREAST TOMOSYNTHESIS BI: CPT

## 2024-08-05 PROCEDURE — 77067 SCR MAMMO BI INCL CAD: CPT

## 2024-08-20 ENCOUNTER — OFFICE VISIT (OUTPATIENT)
Dept: INTERNAL MEDICINE CLINIC | Facility: OTHER | Age: 54
End: 2024-08-20
Payer: COMMERCIAL

## 2024-08-20 VITALS
HEART RATE: 72 BPM | OXYGEN SATURATION: 99 % | SYSTOLIC BLOOD PRESSURE: 118 MMHG | TEMPERATURE: 98.1 F | HEIGHT: 66 IN | RESPIRATION RATE: 18 BRPM | BODY MASS INDEX: 36.58 KG/M2 | DIASTOLIC BLOOD PRESSURE: 82 MMHG | WEIGHT: 227.6 LBS

## 2024-08-20 DIAGNOSIS — R73.01 ELEVATED FASTING GLUCOSE: ICD-10-CM

## 2024-08-20 DIAGNOSIS — E78.2 MIXED HYPERLIPIDEMIA: ICD-10-CM

## 2024-08-20 DIAGNOSIS — J30.1 SEASONAL ALLERGIC RHINITIS DUE TO POLLEN: ICD-10-CM

## 2024-08-20 DIAGNOSIS — L23.9 ALLERGIC CONTACT DERMATITIS, UNSPECIFIED TRIGGER: Primary | ICD-10-CM

## 2024-08-20 PROCEDURE — 99214 OFFICE O/P EST MOD 30 MIN: CPT | Performed by: INTERNAL MEDICINE

## 2024-08-20 RX ORDER — CLOTRIMAZOLE AND BETAMETHASONE DIPROPIONATE 10; .64 MG/G; MG/G
CREAM TOPICAL 2 TIMES DAILY
Qty: 45 G | Refills: 0 | Status: SHIPPED | OUTPATIENT
Start: 2024-08-20 | End: 2024-09-03

## 2024-08-20 NOTE — ASSESSMENT & PLAN NOTE
Discontinue triamcinolone cream, trial of Lotrisone.  She would like to defer on systemic steroids at this time.  She is to contact our office for persistent or worsening symptoms.

## 2024-08-20 NOTE — PROGRESS NOTES
Assessment/Plan:    Seasonal allergic rhinitis due to pollen  Recommended use of over-the-counter antihistamines.    Contact dermatitis  Discontinue triamcinolone cream, trial of Lotrisone.  She would like to defer on systemic steroids at this time.  She is to contact our office for persistent or worsening symptoms.    Mixed hyperlipidemia  Discussed diet and exercise.  Continue to trend lipid panel.    Elevated fasting glucose  Continue to trend.       Diagnoses and all orders for this visit:    Allergic contact dermatitis, unspecified trigger  -     clotrimazole-betamethasone (LOTRISONE) 1-0.05 % cream; Apply topically 2 (two) times a day for 14 days    Seasonal allergic rhinitis due to pollen    Mixed hyperlipidemia    Elevated fasting glucose                  Subjective:      Patient ID: Blanche Kennedy is a 54 y.o. female.    Chief Complaint   Patient presents with   • Follow-up     /6 month - labs done 7/23/24   •      annual   • Rash     Few spots everywhere cream prescribed not helping   • post nasal drip     That causes a tickle in her throat    • ear tickle     In left ear        Blanche Kennedy is seen today for follow up of chronic conditions.   Recent laboratory studies reviewed today with the patient.   she has been compliant with her medication regimen.   She has a rash that has been pruritic and throughout her body. She has been applying triamcinolone cream without improvement. She is getting new spots on her body.  Rashes are localized to bilateral hands and feet.  She is also experiencing a nonproductive cough, postnasal drip, and a tickling sensation of her left ear. She has a h/o sinusitis.   she has no complaints or concerns at this time.       Rash  Pertinent negatives include no congestion, cough, diarrhea, fatigue, fever, rhinorrhea, shortness of breath, sore throat or vomiting.       The following portions of the patient's history were reviewed and updated as appropriate: allergies,  current medications, past family history, past medical history, past social history, past surgical history, and problem list.    Review of Systems   Constitutional:  Negative for activity change, appetite change, chills, diaphoresis, fatigue and fever.   HENT:  Negative for congestion, postnasal drip, rhinorrhea, sinus pressure, sinus pain, sneezing and sore throat.    Eyes:  Negative for visual disturbance.   Respiratory:  Negative for apnea, cough, choking, chest tightness, shortness of breath and wheezing.    Cardiovascular:  Negative for chest pain, palpitations and leg swelling.   Gastrointestinal:  Negative for abdominal distention, abdominal pain, anal bleeding, blood in stool, constipation, diarrhea, nausea and vomiting.   Endocrine: Negative for cold intolerance and heat intolerance.   Genitourinary:  Negative for difficulty urinating, dysuria and hematuria.   Musculoskeletal: Negative.    Skin:  Positive for rash.   Neurological:  Negative for dizziness, weakness, light-headedness, numbness and headaches.   Hematological:  Negative for adenopathy.   Psychiatric/Behavioral:  Negative for agitation, sleep disturbance and suicidal ideas.    All other systems reviewed and are negative.        Past Medical History:   Diagnosis Date   • Colon polyp          Current Outpatient Medications:   •  clotrimazole-betamethasone (LOTRISONE) 1-0.05 % cream, Apply topically 2 (two) times a day for 14 days, Disp: 45 g, Rfl: 0  •  triamcinolone (KENALOG) 0.5 % cream, Apply topically 3 (three) times a day for 14 days, Disp: 15 g, Rfl: 0    Allergies   Allergen Reactions   • Penicillins Rash       Social History   Past Surgical History:   Procedure Laterality Date   • BREAST BIOPSY Right     core biopsy-benign   • COLONOSCOPY     • HYSTERECTOMY  10/22/2013     Family History   Problem Relation Age of Onset   • Arthritis Mother    • Hypertension Father    • Asthma Father    • Glaucoma Father    • No Known Problems Sister    •  "No Known Problems Sister    • No Known Problems Daughter    • No Known Problems Maternal Grandmother    • No Known Problems Maternal Grandfather    • No Known Problems Paternal Grandmother    • No Known Problems Paternal Grandfather    • No Known Problems Son    • No Known Problems Maternal Aunt    • No Known Problems Maternal Aunt    • No Known Problems Maternal Aunt    • No Known Problems Maternal Aunt    • No Known Problems Paternal Aunt        Objective:  /82 (BP Location: Left arm, Patient Position: Sitting, Cuff Size: Large)   Pulse 72   Temp 98.1 °F (36.7 °C) (Temporal)   Resp 18   Ht 5' 5.98\" (1.676 m)   Wt 103 kg (227 lb 9.6 oz)   SpO2 99%   BMI 36.76 kg/m²     Recent Results (from the past 1344 hour(s))   CBC and differential    Collection Time: 07/23/24  7:41 AM   Result Value Ref Range    WBC 7.62 4.31 - 10.16 Thousand/uL    RBC 4.82 3.81 - 5.12 Million/uL    Hemoglobin 14.1 11.5 - 15.4 g/dL    Hematocrit 44.0 34.8 - 46.1 %    MCV 91 82 - 98 fL    MCH 29.3 26.8 - 34.3 pg    MCHC 32.0 31.4 - 37.4 g/dL    RDW 13.2 11.6 - 15.1 %    MPV 11.7 8.9 - 12.7 fL    Platelets 196 149 - 390 Thousands/uL    nRBC 0 /100 WBCs    Segmented % 49 43 - 75 %    Immature Grans % 0 0 - 2 %    Lymphocytes % 38 14 - 44 %    Monocytes % 8 4 - 12 %    Eosinophils Relative 4 0 - 6 %    Basophils Relative 1 0 - 1 %    Absolute Neutrophils 3.75 1.85 - 7.62 Thousands/µL    Absolute Immature Grans 0.02 0.00 - 0.20 Thousand/uL    Absolute Lymphocytes 2.87 0.60 - 4.47 Thousands/µL    Absolute Monocytes 0.64 0.17 - 1.22 Thousand/µL    Eosinophils Absolute 0.27 0.00 - 0.61 Thousand/µL    Basophils Absolute 0.07 0.00 - 0.10 Thousands/µL   Lipid panel    Collection Time: 07/23/24  7:41 AM   Result Value Ref Range    Cholesterol 227 (H) See Comment mg/dL    Triglycerides 96 See Comment mg/dL    HDL, Direct 60 >=50 mg/dL    LDL Calculated 148 (H) 0 - 100 mg/dL    Non-HDL-Chol (CHOL-HDL) 167 mg/dl   TSH, 3rd generation with Free T4 " reflex    Collection Time: 07/23/24  7:41 AM   Result Value Ref Range    TSH 3RD GENERATON 3.538 0.450 - 4.500 uIU/mL   Comprehensive metabolic panel    Collection Time: 07/23/24  7:41 AM   Result Value Ref Range    Sodium 142 135 - 147 mmol/L    Potassium 4.4 3.5 - 5.3 mmol/L    Chloride 102 96 - 108 mmol/L    CO2 30 21 - 32 mmol/L    ANION GAP 10 4 - 13 mmol/L    BUN 12 5 - 25 mg/dL    Creatinine 0.81 0.60 - 1.30 mg/dL    Glucose, Fasting 124 (H) 65 - 99 mg/dL    Calcium 9.6 8.4 - 10.2 mg/dL    AST 16 13 - 39 U/L    ALT 13 7 - 52 U/L    Alkaline Phosphatase 99 34 - 104 U/L    Total Protein 7.5 6.4 - 8.4 g/dL    Albumin 4.1 3.5 - 5.0 g/dL    Total Bilirubin 0.83 0.20 - 1.00 mg/dL    eGFR 82 ml/min/1.73sq m   TIBC Panel (incl. Iron, TIBC, % Iron Saturation)    Collection Time: 07/23/24  7:41 AM   Result Value Ref Range    Iron Saturation 38 15 - 50 %    TIBC 308 250 - 450 ug/dL    Iron 118 50 - 212 ug/dL    UIBC 190 155 - 355 ug/dL   Ferritin    Collection Time: 07/23/24  7:41 AM   Result Value Ref Range    Ferritin 114 11 - 307 ng/mL            Physical Exam  Vitals and nursing note reviewed.   Constitutional:       General: She is not in acute distress.     Appearance: She is well-developed. She is not diaphoretic.   HENT:      Head: Normocephalic and atraumatic.   Eyes:      General:         Right eye: No discharge.         Left eye: No discharge.      Conjunctiva/sclera: Conjunctivae normal.      Pupils: Pupils are equal, round, and reactive to light.   Neck:      Thyroid: No thyromegaly.      Vascular: No JVD.   Cardiovascular:      Rate and Rhythm: Normal rate and regular rhythm.      Heart sounds: Normal heart sounds. No murmur heard.     No friction rub. No gallop.   Pulmonary:      Effort: Pulmonary effort is normal. No respiratory distress.      Breath sounds: Normal breath sounds. No wheezing or rales.   Chest:      Chest wall: No tenderness.   Abdominal:      General: There is no distension.       Palpations: Abdomen is soft.      Tenderness: There is no abdominal tenderness.   Musculoskeletal:         General: No tenderness or deformity. Normal range of motion.      Cervical back: Normal range of motion and neck supple.   Lymphadenopathy:      Cervical: No cervical adenopathy.   Skin:     General: Skin is warm and dry.      Coloration: Skin is not pale.      Findings: No erythema or rash.          Neurological:      Mental Status: She is alert and oriented to person, place, and time.      Cranial Nerves: No cranial nerve deficit.      Coordination: Coordination normal.   Psychiatric:         Behavior: Behavior normal.         Thought Content: Thought content normal.         Judgment: Judgment normal.

## 2024-10-01 ENCOUNTER — APPOINTMENT (OUTPATIENT)
Dept: RADIOLOGY | Age: 54
End: 2024-10-01
Payer: COMMERCIAL

## 2024-10-01 ENCOUNTER — OFFICE VISIT (OUTPATIENT)
Dept: URGENT CARE | Age: 54
End: 2024-10-01
Payer: COMMERCIAL

## 2024-10-01 VITALS
RESPIRATION RATE: 22 BRPM | HEART RATE: 108 BPM | OXYGEN SATURATION: 98 % | SYSTOLIC BLOOD PRESSURE: 138 MMHG | TEMPERATURE: 100.7 F | DIASTOLIC BLOOD PRESSURE: 81 MMHG

## 2024-10-01 DIAGNOSIS — J18.9 PNEUMONIA OF RIGHT MIDDLE LOBE DUE TO INFECTIOUS ORGANISM: Primary | ICD-10-CM

## 2024-10-01 DIAGNOSIS — R05.1 ACUTE COUGH: ICD-10-CM

## 2024-10-01 DIAGNOSIS — R50.9 FEVER, UNSPECIFIED FEVER CAUSE: ICD-10-CM

## 2024-10-01 DIAGNOSIS — M54.50 ACUTE BILATERAL LOW BACK PAIN WITHOUT SCIATICA: ICD-10-CM

## 2024-10-01 LAB
SARS-COV-2 AG UPPER RESP QL IA: NEGATIVE
VALID CONTROL: NORMAL

## 2024-10-01 PROCEDURE — G0383 LEV 4 HOSP TYPE B ED VISIT: HCPCS | Performed by: STUDENT IN AN ORGANIZED HEALTH CARE EDUCATION/TRAINING PROGRAM

## 2024-10-01 PROCEDURE — 71046 X-RAY EXAM CHEST 2 VIEWS: CPT

## 2024-10-01 PROCEDURE — 87811 SARS-COV-2 COVID19 W/OPTIC: CPT | Performed by: STUDENT IN AN ORGANIZED HEALTH CARE EDUCATION/TRAINING PROGRAM

## 2024-10-01 PROCEDURE — S9083 URGENT CARE CENTER GLOBAL: HCPCS | Performed by: STUDENT IN AN ORGANIZED HEALTH CARE EDUCATION/TRAINING PROGRAM

## 2024-10-01 RX ORDER — IBUPROFEN 600 MG/1
600 TABLET, FILM COATED ORAL EVERY 8 HOURS PRN
Qty: 30 TABLET | Refills: 0 | Status: SHIPPED | OUTPATIENT
Start: 2024-10-01 | End: 2024-10-01

## 2024-10-01 RX ORDER — KETOROLAC TROMETHAMINE 30 MG/ML
30 INJECTION, SOLUTION INTRAMUSCULAR; INTRAVENOUS ONCE
Status: COMPLETED | OUTPATIENT
Start: 2024-10-01 | End: 2024-10-01

## 2024-10-01 RX ORDER — CEFPODOXIME PROXETIL 200 MG/1
200 TABLET, FILM COATED ORAL 2 TIMES DAILY
Qty: 10 TABLET | Refills: 0 | Status: SHIPPED | OUTPATIENT
Start: 2024-10-01 | End: 2024-10-06

## 2024-10-01 RX ORDER — AZITHROMYCIN 250 MG/1
TABLET, FILM COATED ORAL
Qty: 6 TABLET | Refills: 0 | Status: SHIPPED | OUTPATIENT
Start: 2024-10-01 | End: 2024-10-05

## 2024-10-01 RX ORDER — IBUPROFEN 600 MG/1
600 TABLET, FILM COATED ORAL EVERY 8 HOURS PRN
Qty: 30 TABLET | Refills: 0 | Status: SHIPPED | OUTPATIENT
Start: 2024-10-01

## 2024-10-01 RX ADMIN — KETOROLAC TROMETHAMINE 30 MG: 30 INJECTION, SOLUTION INTRAMUSCULAR; INTRAVENOUS at 20:34

## 2024-10-01 NOTE — LETTER
October 1, 2024     Patient: Blanche Kennedy   YOB: 1970   Date of Visit: 10/1/2024       To Whom it May Concern:    Blanche Kennedy was seen in my clinic on 10/1/2024.  Please excuse her from work today, 10/2, 10/3, 10/4/2024.    If you have any questions or concerns, please don't hesitate to call.         Sincerely,          Fatmata Whipple, DO

## 2024-10-02 NOTE — PATIENT INSTRUCTIONS
Your rapid COVID test was negative.  I am concerned for pneumonia on my initial read of your chest x-ray.  Radiology just will also read your chest x-ray and you can follow your results in your MyChart.  I am sending in 2 antibiotics to treat pneumonia.  Please take as prescribed.  As we discussed, if you are not improving quickly or are having Any worsening of your symptoms including any shortness of breath, increase in weakness, continued fevers that are not improving, please go to the ER.  You should ask that you are feeling numbers help to monitor you.    You were given a Toradol injection in office today, this is a stronger version of medication related to ibuprofen.  Do not take more ibuprofen tonight, however you may continue with Tylenol tonight for pain or fevers.  Continue to focus on good hydration with water, coconut water, electrolyte beverages such as sugar-free Gatorade or Pedialyte.    I would like you to schedule a recheck appointment with a PCP.  To establish care with a primary care doctor,  Please call central scheduling for an appointment:  [phone] or toll free [phone]

## 2024-10-02 NOTE — PROGRESS NOTES
[facility]        NAME: Blanche Kennedy is a 54 y.o. female  : 1970    MRN: [id number]  DATE: 2024  TIME: 9:03 PM    Assessment and Plan   Pneumonia of right middle lobe due to infectious organism [J18.9]  1. Pneumonia of right lung due to infectious organism  cefpodoxime (VANTIN) 200 mg tablet    azithromycin (ZITHROMAX) 250 mg tablet      2. Fever, unspecified fever cause  Poct Covid 19 Rapid Antigen Test    ketorolac (TORADOL) injection 30 mg    ibuprofen (MOTRIN) 600 mg tablet    DISCONTINUED: ibuprofen (MOTRIN) 600 mg tablet      3. Acute cough  XR chest pa and lateral      4. Acute bilateral low back pain without sciatica  ibuprofen (MOTRIN) 600 mg tablet    DISCONTINUED: ibuprofen (MOTRIN) 600 mg tablet      Negative rapid COVID.  On my read of her x-ray, concern for multifocal right pneumonia.  Penicillin allergy with rash/hives, will cover with cefpodoxime and azithromycin.  Discussed risk of cross-reactivity of cephalosporins.  We discussed that she does not have typical risk factors for severe pneumonia including no significant comorbidities, not over age 65, pulse ox 98% on room air.  However, she is mildly tachycardic, febrile, ill-appearing with concern for multifocal pneumonia.  She was initially tachypneic upon presentation but improved with rest and Toradol.  Joint decision to start with oral antibiotics, however very low threshold to go to the ER if she is not improving or worsening in any way.  Her son and  are at home and will help to monitor her for any worsening.  She is working on establishing with PCP, provided central scheduling number ideally for close recheck.      Patient Instructions   Your rapid COVID test was negative.  I do not see any acute abnormalities on your chest x-ray.  The radiologist will also read your chest x-ray will give you a call with any changes to our plan.    You were given a Toradol injection in office today, this is a stronger  version of medication related to ibuprofen.  Do not take more ibuprofen tonight, however you may continue with Tylenol tonight for pain or fevers.  Continue to focus on good hydration with water, coconut water, electrolyte beverages such as sugar-free Gatorade or Pedialyte.    As we discussed, your symptoms are most likely coming from a viral respiratory illness with your sore throat, congestion and cough.  However, if you feel that your symptoms are worsening or not getting better including continued high fevers, severe back pain, shortness of breath, please go to the ER for further evaluation.    To establish care with a primary care doctor,  Please call central scheduling for an appointment:  [phone] or toll free [phone]     Follow up with PCP in 3-5 days.  Proceed to  ER if symptoms worsen.    If tests have been performed at [facility], our office will contact you with results if changes need to be made to the care plan discussed with you at the visit.  You [facility]    Chief Complaint     Chief Complaint   Patient presents with    Fever     Patient reports started with symptoms of chills on Saturday. Sunday reports temp of 108.0 on Sunday spine and joint pain. Taking theraflu, mucinex. Last dose of theraflu this morning @ 10:30am.     Cough    Joint Pain         History of Present Illness       Patient presents for symptoms that started about 4 days ago.  She started with more mild symptoms of sore throat on Saturday, then developed chest congestion, cough.  Feels chest tightness with the cough.  Not short of breath unless coughing.  She has had significant muscle aches, fatigue, malaise, worsening pain in her low back rating to bilateral sides and bilateral hips.  Pain in her throat also radiates into her bilateral ears.  Normal urination and stooling.  No known sick contacts, she did travel but this was over a month ago before her symptoms started.  She  measured a fever at home that she says was up to 108.0F.  Has been hydrating with water and coconut water, not having much appetite, tried multiple OTCs including TheraFlu, Mucinex.  Has not tried any antipyretics.  Overall she feels that her fever curve has improved.    Fever  Associated symptoms include coughing.   Cough        Review of Systems   Review of Systems   Respiratory:  Positive for cough.    All other systems reviewed and are negative.        Current Medications       Current Outpatient Medications:     azithromycin (ZITHROMAX) 250 mg tablet, Take 2 tablets today then 1 tablet daily x 4 days, Disp: 6 tablet, Rfl: 0    cefpodoxime (VANTIN) 200 mg tablet, Take 1 tablet (200 mg total) by mouth 2 (two) times a day for 5 days, Disp: 10 tablet, Rfl: 0    ibuprofen (MOTRIN) 600 mg tablet, Take 1 tablet (600 mg total) by mouth every 8 (eight) hours as needed for mild pain, moderate pain or fever, Disp: 30 tablet, Rfl: 0    clotrimazole-betamethasone (LOTRISONE) 1-0.05 % cream, Apply topically 2 (two) times a day for 14 days, Disp: 45 g, Rfl: 0    triamcinolone (KENALOG) 0.5 % cream, Apply topically 3 (three) times a day for 14 days, Disp: 15 g, Rfl: 0  No current facility-administered medications for this visit.    Current Allergies     Allergies as of 10/01/2024 - Reviewed 10/01/2024   Allergen Reaction Noted    Penicillins Rash 08/12/2013            The following portions of the patient's history were reviewed and updated as appropriate: allergies, current medications, past family history, past medical history, past social history, past surgical history and problem list.     Past Medical History:   Diagnosis Date    Colon polyp        Past Surgical History:   Procedure Laterality Date    BREAST BIOPSY Right     core biopsy-benign    COLONOSCOPY      HYSTERECTOMY  10/22/2013       Family History   Problem Relation Age of Onset    Arthritis Mother     Hypertension Father     Asthma Father     Glaucoma Father      No Known Problems Sister     No Known Problems Sister     No Known Problems Daughter     No Known Problems Maternal Grandmother     No Known Problems Maternal Grandfather     No Known Problems Paternal Grandmother     No Known Problems Paternal Grandfather     No Known Problems Son     No Known Problems Maternal Aunt     No Known Problems Maternal Aunt     No Known Problems Maternal Aunt     No Known Problems Maternal Aunt     No Known Problems Paternal Aunt          Medications have been verified.        Objective   /81   Pulse (!) 108   Temp (!) 100.7 °F (38.2 °C) (Oral)   Resp 22   SpO2 98%   No LMP recorded. Patient has had a hysterectomy.       Physical Exam     Physical Exam  Vitals and nursing note reviewed.   Constitutional:       Appearance: Normal appearance. She is ill-appearing.   HENT:      Head: Normocephalic and atraumatic.      Right Ear: Ear canal and external ear normal.      Left Ear: Ear canal and external ear normal.      Ears:      Comments: Bilateral clear effusions, no erythema, no bulging     Nose: Nose normal.      Mouth/Throat:      Mouth: Mucous membranes are moist.   Eyes:      Extraocular Movements: Extraocular movements intact.   Cardiovascular:      Rate and Rhythm: Normal rate and regular rhythm.      Heart sounds: Normal heart sounds.   Pulmonary:      Effort: Pulmonary effort is normal. No respiratory distress.      Breath sounds: No stridor. No wheezing, rhonchi or rales.   Musculoskeletal:         General: Tenderness present. No swelling or deformity.      Comments: Tender to midline lumbar as well as bilateral lumbar paraspinals.   Skin:     General: Skin is warm and dry.      Capillary Refill: Capillary refill takes less than 2 seconds.      Findings: No rash.   Neurological:      Mental Status: She is alert.      Gait: Gait normal.   Psychiatric:         Mood and Affect: Mood normal.         Behavior: Behavior normal.         Thought Content: Thought content  normal.

## 2024-11-06 ENCOUNTER — OFFICE VISIT (OUTPATIENT)
Dept: INTERNAL MEDICINE CLINIC | Facility: OTHER | Age: 54
End: 2024-11-06
Payer: COMMERCIAL

## 2024-11-06 VITALS
TEMPERATURE: 98 F | WEIGHT: 226.8 LBS | BODY MASS INDEX: 36.45 KG/M2 | HEIGHT: 66 IN | SYSTOLIC BLOOD PRESSURE: 118 MMHG | DIASTOLIC BLOOD PRESSURE: 68 MMHG | HEART RATE: 85 BPM | OXYGEN SATURATION: 97 % | RESPIRATION RATE: 20 BRPM

## 2024-11-06 DIAGNOSIS — L30.9 DERMATITIS: ICD-10-CM

## 2024-11-06 DIAGNOSIS — M54.50 ACUTE MIDLINE LOW BACK PAIN WITHOUT SCIATICA: Primary | ICD-10-CM

## 2024-11-06 PROBLEM — L25.9 CONTACT DERMATITIS: Status: RESOLVED | Noted: 2024-07-22 | Resolved: 2024-11-06

## 2024-11-06 PROCEDURE — 99213 OFFICE O/P EST LOW 20 MIN: CPT | Performed by: INTERNAL MEDICINE

## 2024-11-06 RX ORDER — BACLOFEN 10 MG/1
10 TABLET ORAL
Qty: 14 TABLET | Refills: 0 | Status: SHIPPED | OUTPATIENT
Start: 2024-11-06

## 2024-11-06 RX ORDER — PIMECROLIMUS 10 MG/G
CREAM TOPICAL 2 TIMES DAILY
Qty: 60 G | Refills: 0 | Status: SHIPPED | OUTPATIENT
Start: 2024-11-06

## 2024-11-06 RX ORDER — DICLOFENAC SODIUM 75 MG/1
75 TABLET, DELAYED RELEASE ORAL 2 TIMES DAILY PRN
Qty: 28 TABLET | Refills: 0 | Status: SHIPPED | OUTPATIENT
Start: 2024-11-06 | End: 2024-11-20

## 2024-11-06 NOTE — PROGRESS NOTES
Ambulatory Visit  Name: Blanche Kennedy      : 1970      MRN: [id number]  Encounter Provider: Wade Rutherford MD  Encounter Date: 2024   Encounter department: [facility]    Assessment & Plan  Dermatitis  Will trial Elidel cream twice daily.  If rash does not resolve, will refer to dermatology for further evaluation.      Orders:    pimecrolimus (ELIDEL) 1 % cream; Apply topically 2 (two) times a day    Acute midline low back pain without sciatica  Discussed range of motion and stretching exercise.  Will prescribe diclofenac twice daily and baclofen at bedtime for 10-days.  She is to contact our office for persistent or worsening symptoms.    Orders:    diclofenac (VOLTAREN) 75 mg EC tablet; Take 1 tablet (75 mg total) by mouth 2 (two) times a day as needed (pain) for up to 14 days    baclofen 10 mg tablet; Take 1 tablet (10 mg total) by mouth daily at bedtime as needed for muscle spasms       History of Present Illness     Blanche Kennedy is seen today with concern for acute low back of 2 weeks duration.   She denies any recent injury or exertional activity.   She was on antibiotics for treatment of pneumonia. She denies any residual symptoms.   She continues to have a rash on her left foot and bilateral hands. The rash did not improve with clotrimazole-betamethasone or triamcinolone creams. The lesions will dry up and recur.     Back Pain  This is a new problem. The current episode started 1 to 4 weeks ago. The problem occurs daily. The problem is unchanged. The pain is present in the lumbar spine. The quality of the pain is described as aching. The pain does not radiate. Pertinent negatives include no abdominal pain, chest pain, dysuria, fever, headaches, numbness or weakness. She has tried nothing for the symptoms.   Rash  This is a chronic problem. The current episode started more than 1 month ago. The affected locations include the left foot, right hand and  left hand. Pertinent negatives include no congestion, cough, diarrhea, fatigue, fever, rhinorrhea, shortness of breath, sore throat or vomiting.   Pneumonia  There is no cough, shortness of breath or wheezing. Pertinent negatives include no appetite change, chest pain, fever, headaches, postnasal drip, rhinorrhea, sneezing or sore throat.       History obtained from : patient  Review of Systems   Constitutional:  Negative for activity change, appetite change, chills, diaphoresis, fatigue and fever.   HENT:  Negative for congestion, postnasal drip, rhinorrhea, sinus pressure, sinus pain, sneezing and sore throat.    Eyes:  Negative for visual disturbance.   Respiratory:  Negative for apnea, cough, choking, chest tightness, shortness of breath and wheezing.    Cardiovascular:  Negative for chest pain, palpitations and leg swelling.   Gastrointestinal:  Negative for abdominal distention, abdominal pain, anal bleeding, blood in stool, constipation, diarrhea, nausea and vomiting.   Endocrine: Negative for cold intolerance and heat intolerance.   Genitourinary:  Negative for difficulty urinating, dysuria and hematuria.   Musculoskeletal:  Positive for back pain.   Skin:  Positive for rash.   Neurological:  Negative for dizziness, weakness, light-headedness, numbness and headaches.   Hematological:  Negative for adenopathy.   Psychiatric/Behavioral:  Negative for agitation, sleep disturbance and suicidal ideas.    All other systems reviewed and are negative.    Medical History Reviewed by provider this encounter:  Tobacco  Allergies  Meds  Problems  Med Hx  Surg Hx  Fam Hx       Current Outpatient Medications on File Prior to Visit   Medication Sig Dispense Refill    Albuterol Sulfate 108 (90 Base) MCG/ACT AEPB Inhale 1 puff every 4 (four) hours as needed      clotrimazole-betamethasone (LOTRISONE) 1-0.05 % cream Apply topically 2 (two) times a day for 14 days 45 g 0    triamcinolone (KENALOG) 0.5 % cream Apply  "topically 3 (three) times a day for 14 days 15 g 0    [DISCONTINUED] ibuprofen (MOTRIN) 600 mg tablet Take 1 tablet (600 mg total) by mouth every 8 (eight) hours as needed for mild pain, moderate pain or fever (Patient not taking: Reported on 11/6/2024) 30 tablet 0     No current facility-administered medications on file prior to visit.      Social History     Tobacco Use    Smoking status: Never    Smokeless tobacco: Never   Vaping Use    Vaping status: Never Used   Substance and Sexual Activity    Alcohol use: Never    Drug use: Never    Sexual activity: Not on file         Objective     /68 (BP Location: Left arm, Patient Position: Sitting, Cuff Size: Large)   Pulse 85   Temp 98 °F (36.7 °C) (Temporal)   Resp 20   Ht 5' 5.98\" (1.676 m)   Wt 103 kg (226 lb 12.8 oz)   SpO2 97%   BMI 36.63 kg/m²     Physical Exam  Vitals and nursing note reviewed.   Constitutional:       General: She is not in acute distress.     Appearance: She is well-developed. She is not diaphoretic.   HENT:      Head: Normocephalic and atraumatic.   Eyes:      General:         Right eye: No discharge.         Left eye: No discharge.      Conjunctiva/sclera: Conjunctivae normal.      Pupils: Pupils are equal, round, and reactive to light.   Neck:      Thyroid: No thyromegaly.      Vascular: No JVD.   Cardiovascular:      Rate and Rhythm: Normal rate and regular rhythm.      Heart sounds: Normal heart sounds. No murmur heard.     No friction rub. No gallop.   Pulmonary:      Effort: Pulmonary effort is normal. No respiratory distress.      Breath sounds: Normal breath sounds. No wheezing or rales.   Chest:      Chest wall: No tenderness.   Abdominal:      General: There is no distension.      Palpations: Abdomen is soft.      Tenderness: There is no abdominal tenderness.   Musculoskeletal:         General: No tenderness or deformity. Normal range of motion.      Cervical back: Normal range of motion and neck supple.      Lumbar back: " Spasms present.   Lymphadenopathy:      Cervical: No cervical adenopathy.   Skin:     General: Skin is warm and dry.      Coloration: Skin is not pale.      Findings: No erythema or rash.   Neurological:      Mental Status: She is alert and oriented to person, place, and time.      Cranial Nerves: No cranial nerve deficit.      Coordination: Coordination normal.   Psychiatric:         Behavior: Behavior normal.         Thought Content: Thought content normal.         Judgment: Judgment normal.       Administrative Statements   I have spent a total time of 15 minutes in caring for this patient on the day of the visit/encounter including Diagnostic results, Prognosis, Risks and benefits of tx options, Instructions for management, Patient and family education, Risk factor reductions, Impressions, Counseling / Coordination of care, Documenting in the medical record, Reviewing / ordering tests, medicine, procedures  , and Obtaining or reviewing history  .

## 2024-11-06 NOTE — ASSESSMENT & PLAN NOTE
Discussed range of motion and stretching exercise.  Will prescribe diclofenac twice daily and baclofen at bedtime for 10-days.  She is to contact our office for persistent or worsening symptoms.    Orders:    diclofenac (VOLTAREN) 75 mg EC tablet; Take 1 tablet (75 mg total) by mouth 2 (two) times a day as needed (pain) for up to 14 days    baclofen 10 mg tablet; Take 1 tablet (10 mg total) by mouth daily at bedtime as needed for muscle spasms

## 2024-11-06 NOTE — ASSESSMENT & PLAN NOTE
Will trial Elidel cream twice daily.  If rash does not resolve, will refer to dermatology for further evaluation.      Orders:    pimecrolimus (ELIDEL) 1 % cream; Apply topically 2 (two) times a day

## 2025-02-26 ENCOUNTER — OFFICE VISIT (OUTPATIENT)
Dept: BARIATRICS | Facility: CLINIC | Age: 55
End: 2025-02-26
Payer: COMMERCIAL

## 2025-02-26 VITALS
DIASTOLIC BLOOD PRESSURE: 74 MMHG | HEART RATE: 78 BPM | BODY MASS INDEX: 35.23 KG/M2 | HEIGHT: 66 IN | RESPIRATION RATE: 16 BRPM | SYSTOLIC BLOOD PRESSURE: 122 MMHG | WEIGHT: 219.2 LBS

## 2025-02-26 DIAGNOSIS — E66.09 CLASS 2 OBESITY DUE TO EXCESS CALORIES WITHOUT SERIOUS COMORBIDITY WITH BODY MASS INDEX (BMI) OF 36.0 TO 36.9 IN ADULT: Primary | ICD-10-CM

## 2025-02-26 DIAGNOSIS — R63.2 EXCESSIVE HUNGER: ICD-10-CM

## 2025-02-26 DIAGNOSIS — R63.8 ABNORMAL CRAVING: ICD-10-CM

## 2025-02-26 DIAGNOSIS — E66.812 CLASS 2 OBESITY DUE TO EXCESS CALORIES WITHOUT SERIOUS COMORBIDITY WITH BODY MASS INDEX (BMI) OF 36.0 TO 36.9 IN ADULT: Primary | ICD-10-CM

## 2025-02-26 PROCEDURE — 99203 OFFICE O/P NEW LOW 30 MIN: CPT

## 2025-02-26 RX ORDER — BUPROPION HYDROCHLORIDE 150 MG/1
150 TABLET, EXTENDED RELEASE ORAL 2 TIMES DAILY
Qty: 180 TABLET | Refills: 0 | Status: SHIPPED | OUTPATIENT
Start: 2025-02-26

## 2025-02-26 RX ORDER — NALTREXONE HYDROCHLORIDE 50 MG/1
50 TABLET, FILM COATED ORAL DAILY
Qty: 90 TABLET | Refills: 0 | Status: SHIPPED | OUTPATIENT
Start: 2025-02-26

## 2025-02-26 NOTE — ASSESSMENT & PLAN NOTE
- Discussed options of HealthyCORE-Intensive Lifestyle Intervention Program, Very Low Calorie Diet-VLCD, and Conservative Program and the role of weight loss medications.  - Patient is interested in pursuing Conservative Program and follow up visits with medical weight management provider.  - Explained the importance of making lifestyle changes in addition to starting any anti-obesity medications.   - Initial weight loss goal of 5-10% weight loss for improved health. Weight loss can improve patient's co-morbid conditions and/or prevent weight-related complications.  - Weight is not at goal and patient has been unable to achieve a meaningful weight loss above 5% using various programs and tools for more than 6 months  - Labs reviewed from 10/24    General Recommendations:  Nutrition:  Eat breakfast daily.  Do not skip meals.      Food log (ie.) www.FINXI.com, sparkpeople.com, loseit.com, calorieking.com, etc.     Practice mindful eating.  Be sure to set aside time to eat, eat slowly, and savor your food.     Hydration:    At least 64oz of water daily.  No sugar sweetened beverages.  No juice (eat the fruit instead).     Exercise:  Studies have shown that the ideal exercise goal is somewhere between 150 to 300 minutes of moderate intensity exercise a week.  Start with exercising 10 minutes every other day and gradually increase physical activity with a goal of at least 150 minutes of moderate intensity exercise a week, divided over at least 3 days a week.  An example of this would be exercising 30 minutes a day, 5 days a week.  Resistance training can increase muscle mass and increase our resting metabolic rate.   FULL BODY resistance training is recommended 2-3 times a week.  Do not do this on consecutive days to allow for muscle recovery.     Aim for a bare minimum 5000 steps, even on days you do not exercise.     Monitoring:   Weigh yourself daily.  If this causes undue stress, then just weigh yourself once  a week.  Weigh yourself the same time of the day with the same amount of clothing on.  Preferably this should be done after waking up, before you eat, and with no clothing or minimal clothing on.     Specific Goals:  Calorie goal:  [id number] moraima/day (Provided with meal plan to follow). Discussed her lifestyle and nutrition and explained that she should start by lowering her carb intake and increasing her protein and vegetable intake.   Encouraged to start tracking food.   Eat at least 3 meals per day with a focus on protein. Do not skip meals. Protein rich snacks discussed and protein shake options discussed.  Exercise 1-2 days per week to start for 10-15 minutes per day and increase from there while including some weight training.   Hydrate with 64 oz of water per day and cut down on sugary beverages and soda.   Discussed medications. She is not contraindicated to take any of them, however she is nervous about side effects of phentermine and Topamax and is not interested in metformin at all. She is also scared of needles and does not want the shots, on top of that her insurance will cover but they are teir 2 which may be to expensive for her to maintain. Will start with wellbutrin/naltrexone and follow up in 2 months to monitor progress.   Patient denies personal and family history of  pancreatitis, thyroid cancer, MEN-2 tumors.  Denies any hx of glaucoma, seizures, kidney stones, gallstones, or gastroparesis.   Denies Hx of CAD, PAD, palpitations, arrhythmia.   Denies uncontrolled anxiety or depression, suicidal behavior or thinking.   Denies insomnia or sleep disturbance.    Bupropion/naltrexone instructions Reviewed:  Week 1: bupropion 150mg ONCE daily in the evening  Week 2: bupropion 150mg TWICE daily in the morning and evening  Week 3: continue with bupropion twice daily and ADD naltrexone 25mg (1/2 tablet) once daily in the morning   Week 4: continue with bupropion twice daily and INCREASE the naltrexone  25mg (1/2 tablet) twice daily in the morning and dinner  The potential side effects of bupropion/naltrexone may include: abdominal upset, headache, dizziness, trouble sleeping, increased blood pressure, depression/anxiety, and fatigue. Please call/return if you develops symptoms of depression or anxiety. You should go to the  ER for evaluation if you develop any thoughts of harming yourself or others occur.  Medication consent was reviewed today and all questions were answered.  Patient agreed with all bulleted points.  Consent was signed, scanned into chart and patient was provided with a copy for their records.

## 2025-02-26 NOTE — PROGRESS NOTES
Assessment/Plan:    Class 2 obesity due to excess calories without serious comorbidity with body mass index (BMI) of 36.0 to 36.9 in adult  - Discussed options of HealthyCORE-Intensive Lifestyle Intervention Program, Very Low Calorie Diet-VLCD, and Conservative Program and the role of weight loss medications.  - Patient is interested in pursuing Conservative Program and follow up visits with medical weight management provider.  - Explained the importance of making lifestyle changes in addition to starting any anti-obesity medications.   - Initial weight loss goal of 5-10% weight loss for improved health. Weight loss can improve patient's co-morbid conditions and/or prevent weight-related complications.  - Weight is not at goal and patient has been unable to achieve a meaningful weight loss above 5% using various programs and tools for more than 6 months  - Labs reviewed from 10/24    General Recommendations:  Nutrition:  Eat breakfast daily.  Do not skip meals.      Food log (ie.) www.[employer].com, sparkpeople.com, loseit.com, calorieking.com, etc.     Practice mindful eating.  Be sure to set aside time to eat, eat slowly, and savor your food.     Hydration:    At least 64oz of water daily.  No sugar sweetened beverages.  No juice (eat the fruit instead).     Exercise:  Studies have shown that the ideal exercise goal is somewhere between 150 to 300 minutes of moderate intensity exercise a week.  Start with exercising 10 minutes every other day and gradually increase physical activity with a goal of at least 150 minutes of moderate intensity exercise a week, divided over at least 3 days a week.  An example of this would be exercising 30 minutes a day, 5 days a week.  Resistance training can increase muscle mass and increase our resting metabolic rate.   FULL BODY resistance training is recommended 2-3 times a week.  Do not do this on consecutive days to allow for muscle recovery.     Aim for a bare minimum 5000  steps, even on days you do not exercise.     Monitoring:   Weigh yourself daily.  If this causes undue stress, then just weigh yourself once a week.  Weigh yourself the same time of the day with the same amount of clothing on.  Preferably this should be done after waking up, before you eat, and with no clothing or minimal clothing on.     Specific Goals:  Calorie goal:  [id number] moraima/day (Provided with meal plan to follow). Discussed her lifestyle and nutrition and explained that she should start by lowering her carb intake and increasing her protein and vegetable intake.   Encouraged to start tracking food.   Eat at least 3 meals per day with a focus on protein. Do not skip meals. Protein rich snacks discussed and protein shake options discussed.  Exercise 1-2 days per week to start for 10-15 minutes per day and increase from there while including some weight training.   Hydrate with 64 oz of water per day and cut down on sugary beverages and soda.   Discussed medications. She is not contraindicated to take any of them, however she is nervous about side effects of phentermine and Topamax and is not interested in metformin at all. She is also scared of needles and does not want the shots, on top of that her insurance will cover but they are teir 2 which may be to expensive for her to maintain. Will start with wellbutrin/naltrexone and follow up in 2 months to monitor progress.   Patient denies personal and family history of  pancreatitis, thyroid cancer, MEN-2 tumors.  Denies any hx of glaucoma, seizures, kidney stones, gallstones, or gastroparesis.   Denies Hx of CAD, PAD, palpitations, arrhythmia.   Denies uncontrolled anxiety or depression, suicidal behavior or thinking.   Denies insomnia or sleep disturbance.    Bupropion/naltrexone instructions Reviewed:  Week 1: bupropion 150mg ONCE daily in the evening  Week 2: bupropion 150mg TWICE daily in the morning and evening  Week 3: continue with bupropion twice daily  and ADD naltrexone 25mg (1/2 tablet) once daily in the morning   Week 4: continue with bupropion twice daily and INCREASE the naltrexone 25mg (1/2 tablet) twice daily in the morning and dinner  The potential side effects of bupropion/naltrexone may include: abdominal upset, headache, dizziness, trouble sleeping, increased blood pressure, depression/anxiety, and fatigue. Please call/return if you develops symptoms of depression or anxiety. You should go to the  ER for evaluation if you develop any thoughts of harming yourself or others occur.  Medication consent was reviewed today and all questions were answered.  Patient agreed with all bulleted points.  Consent was signed, scanned into chart and patient was provided with a copy for their records.          Blanche was seen today for consult.    Diagnoses and all orders for this visit:    Class 2 obesity due to excess calories without serious comorbidity with body mass index (BMI) of 36.0 to 36.9 in adult  -     buPROPion (Wellbutrin SR) 150 mg 12 hr tablet; Take 1 tablet (150 mg total) by mouth 2 (two) times a day Take 1 tablet once daily in the evening then after 1 week increase to 1 tablet twice daily in the morning and evening  -     naltrexone (REVIA) 50 mg tablet; Take 1 tablet (50 mg total) by mouth daily Take 1/2 tablet (25mg) once daily in the morning then after 1 week increase to 1/2 tablet (25mg) twice daily in the morning and evening    Abnormal craving  -     buPROPion (Wellbutrin SR) 150 mg 12 hr tablet; Take 1 tablet (150 mg total) by mouth 2 (two) times a day Take 1 tablet once daily in the evening then after 1 week increase to 1 tablet twice daily in the morning and evening  -     naltrexone (REVIA) 50 mg tablet; Take 1 tablet (50 mg total) by mouth daily Take 1/2 tablet (25mg) once daily in the morning then after 1 week increase to 1/2 tablet (25mg) twice daily in the morning and evening    Excessive hunger  -     buPROPion (Wellbutrin SR) 150 mg 12  hr tablet; Take 1 tablet (150 mg total) by mouth 2 (two) times a day Take 1 tablet once daily in the evening then after 1 week increase to 1 tablet twice daily in the morning and evening  -     naltrexone (REVIA) 50 mg tablet; Take 1 tablet (50 mg total) by mouth daily Take 1/2 tablet (25mg) once daily in the morning then after 1 week increase to 1/2 tablet (25mg) twice daily in the morning and evening           Total time spent reviewing chart, interviewing patient, examining patient, discussing plan, answering all questions, and documentin min, with >50% face-to-face time spent counseling patient on nonsurgical interventions for the treatment of excess weight. Discussed in detail nonsurgical options including intensive lifestyle intervention program, very low-calorie diet program and conservative program.  Discussed the role of weight loss medications.  Counseled patient on diet behavior and exercise modification for weight loss.    Follow up in approximately 2 months with Non-Surgical Physician/Advanced Practitioner.    Subjective:   Chief Complaint   Patient presents with    Consult     Initial visit with caro       Patient ID: Blanche Kennedy  is a 54 y.o. female with excess weight/obesity here to pursue weight management.  Previous notes and records have been reviewed.    Past Medical History:   Diagnosis Date    Colon polyp      Past Surgical History:   Procedure Laterality Date    BREAST BIOPSY Right     core biopsy-benign    COLONOSCOPY      HYSTERECTOMY  10/22/2013       HPI:  Wt Readings from Last 20 Encounters:   25 99.4 kg (219 lb 3.2 oz)   24 103 kg (226 lb 12.8 oz)   24 103 kg (227 lb 9.6 oz)   24 104 kg (229 lb)   24 104 kg (229 lb)   04/15/24 100 kg (221 lb)   24 100 kg (221 lb 3.2 oz)   24 97.5 kg (215 lb)   23 98.2 kg (216 lb 6.4 oz)   23 95.3 kg (210 lb)       Patient presents today to medical weight management office for consult.  She is here for some guidance in weight loss. She has struggles with her weight since after her child was born. She knows she has a lot of changes she needs to make as her diet is kelle based and she eats a lot of carbs.       Starting MWM weight: 219 lbs   Starting BMI: 35.3  Starting Waist Measurement: 40 inches   Goal weight: 150 lbs or 160 lbs     Obesity/Excess Weight:  Severity: Severe  Onset:  18 years ago     Modifiers: Diet and Exercise  Contributing factors: Poor Food Choices, Insufficient Physical Activity, Stress/Emotional Eating, Binge Eating, Menopause, Lack of knowledge of appropriate lifestyle changes, and Insufficient time to make appropriate lifestyle changes  Associated symptoms: comorbid conditions, fatigue, increased joint pain, decreased exercise capacity, body image issues, decreased self esteem, decreased mobility, inability to do certain activities, and clothes do not fit    Diet recall:  B: Bananas, white bread or bagel and cc, coffee with creamer   S: Orange or banana   L: Korean or  food or sushi, very carb heavy / pineapple juice or soda (regular)  S: Sweets or yogurt   D: Skip or cup of tea  Take out frequency: rare    Hydration: Water- not much maybe a bottle a day   Alcohol: no  Smoking: no   Exercise: Yes, aerobics and dance classes/ [employer]   Occupation: In office   Sleep: 4-5 hours - goes to bed late       The following portions of the patient's history were reviewed and updated as appropriate: allergies, current medications, past family history, past medical history, past social history, past surgical history, and problem list.    Family History   Problem Relation Age of Onset    Arthritis Mother     Hypertension Father     Asthma Father     Glaucoma Father     No Known Problems Sister     No Known Problems Sister     No Known Problems Daughter     No Known Problems Maternal Grandmother     No Known Problems Maternal Grandfather     No Known Problems Paternal Grandmother  "    No Known Problems Paternal Grandfather     No Known Problems Son     No Known Problems Maternal Aunt     No Known Problems Maternal Aunt     No Known Problems Maternal Aunt     No Known Problems Maternal Aunt     No Known Problems Paternal Aunt         Review of Systems   Constitutional:  Negative for fatigue.   HENT:  Negative for sore throat.    Respiratory:  Negative for cough and shortness of breath.    Cardiovascular:  Negative for chest pain, palpitations and leg swelling.   Gastrointestinal:  Negative for abdominal pain, constipation, diarrhea, nausea and vomiting.   Genitourinary:  Negative for dysuria.   Musculoskeletal:  Negative for arthralgias and back pain.   Skin:  Negative for rash.   Neurological:  Negative for headaches.   Psychiatric/Behavioral:  Negative for dysphoric mood. The patient is not nervous/anxious.        Objective:  /74 (BP Location: Left arm, Patient Position: Sitting, Cuff Size: Large)   Pulse 78   Resp 16   Ht 5' 6\" (1.676 m)   Wt 99.4 kg (219 lb 3.2 oz)   BMI 35.38 kg/m²     Physical Exam  Vitals and nursing note reviewed.   Constitutional:       Appearance: Normal appearance. She is obese.   HENT:      Head: Normocephalic.   Pulmonary:      Effort: Pulmonary effort is normal.   Neurological:      Mental Status: She is oriented to person, place, and time.   Psychiatric:         Mood and Affect: Mood normal.         Behavior: Behavior normal.         Thought Content: Thought content normal.         Judgment: Judgment normal.              Labs and Imaging  Recent labs and imaging have been personally reviewed.  Lab Results   Component Value Date    WBC 7.62 07/23/2024    HGB 14.1 07/23/2024    HCT 44.0 07/23/2024    MCV 91 07/23/2024     07/23/2024     Lab Results   Component Value Date    SODIUM 137 10/03/2024    K 4.1 10/03/2024     10/03/2024    CO2 27 10/03/2024    AGAP 10 10/03/2024    BUN 10 10/03/2024    CREATININE 0.91 10/03/2024    GLUC 118 (H) " "10/03/2024    GLUF 124 (H) 07/23/2024    CALCIUM 9.7 10/03/2024    AST 29 10/03/2024    ALT 24 10/03/2024    ALKPHOS 71 10/03/2024    TP 7.6 10/03/2024    TBILI 0.5 10/03/2024    EGFR 74 10/03/2024     No results found for: \"HGBA1C\"  Lab Results   Component Value Date    ZDY3DKDYUOMB 3.538 07/23/2024     Lab Results   Component Value Date    CHOLESTEROL 227 (H) 07/23/2024     Lab Results   Component Value Date    HDL 60 07/23/2024     Lab Results   Component Value Date    TRIG 96 07/23/2024     Lab Results   Component Value Date    LDLCALC 148 (H) 07/23/2024     "

## 2025-04-30 ENCOUNTER — OFFICE VISIT (OUTPATIENT)
Age: 55
End: 2025-04-30
Payer: COMMERCIAL

## 2025-04-30 VITALS
TEMPERATURE: 97.8 F | HEART RATE: 74 BPM | SYSTOLIC BLOOD PRESSURE: 118 MMHG | BODY MASS INDEX: 34.65 KG/M2 | DIASTOLIC BLOOD PRESSURE: 72 MMHG | OXYGEN SATURATION: 97 % | HEIGHT: 66 IN | WEIGHT: 215.6 LBS

## 2025-04-30 DIAGNOSIS — R73.01 IMPAIRED FASTING GLUCOSE: ICD-10-CM

## 2025-04-30 DIAGNOSIS — E66.09 CLASS 2 OBESITY DUE TO EXCESS CALORIES WITHOUT SERIOUS COMORBIDITY WITH BODY MASS INDEX (BMI) OF 36.0 TO 36.9 IN ADULT: Primary | ICD-10-CM

## 2025-04-30 DIAGNOSIS — E66.812 CLASS 2 OBESITY DUE TO EXCESS CALORIES WITHOUT SERIOUS COMORBIDITY WITH BODY MASS INDEX (BMI) OF 36.0 TO 36.9 IN ADULT: Primary | ICD-10-CM

## 2025-04-30 PROCEDURE — 99214 OFFICE O/P EST MOD 30 MIN: CPT | Performed by: PHYSICIAN ASSISTANT

## 2025-04-30 NOTE — PROGRESS NOTES
Assessment/Plan:    No problem-specific Assessment & Plan notes found for this encounter.           Blanche was seen today for follow-up.    Diagnoses and all orders for this visit:    Class 2 obesity due to excess calories without serious comorbidity with body mass index (BMI) of 36.0 to 36.9 in adult    Impaired fasting glucose       - Discussed options of HealthyCORE-Intensive Lifestyle Intervention Program, Very Low Calorie Diet-VLCD, and Conservative Program and the role of weight loss medications.  - Explained the importance of making lifestyle changes first before starting anti-obesity medications.  - Patient should demonstrate lifestyle changes first before anti-obesity medication initiated.   - Patient is interested in pursuing Conservative Program  - Initial weight loss goal of 5-10% weight loss for improved health as studies have shown this is where we see the greatest impact on improving health and decreasing risk of obesity related conditions.  - Weight loss can improve patient's co-morbid conditions and/or prevent weight-related complications.    - Labs reviewed: As below.      General Recommendations:  Nutrition:  Eat breakfast daily.  Do not skip meals.     Food log (ie.) www.[employer].com, sparkpeople.com, loseit.com, calorieking.com, etc.    Practice mindful eating.  Be sure to set aside time to eat, eat slowly, and savor your food.    Hydration:    At least 64oz of water daily.  No sugar sweetened beverages.  No juice (eat the fruit instead).    Exercise:  Studies have shown that the ideal exercise goal is somewhere between 150 to 300 minutes of moderate intensity exercise a week.  Start with exercising 10 minutes every other day and gradually increase physical activity with a goal of at least 150 minutes of moderate intensity exercise a week, divided over at least 3 days a week.  An example of this would be exercising 30 minutes a day, 5 days a week.  Resistance training can increase muscle mass  and increase our resting metabolic rate.   FULL BODY resistance training is recommended 2-3 times a week.  Do not do this on consecutive days to allow for muscle recovery.    Aim for a bare minimum 5000 steps, even on days you do not exercise.    Monitoring:   Weigh yourself daily.  If this causes undue stress, then just weigh yourself once a week.  Weigh yourself the same time of the day with the same amount of clothing on.  Preferably this should be done after waking up, before you eat, and with no clothing or minimal clothing on.    Specific Goals:  Food log (ie.) www.[employer].com,sparkpeople.com,[employer].com,[employer]com,etc.   Gradually increase physical activity to a goal of 5 days per week for 30 minutes of MODERATE intensity PLUS 2 days per week of FULL BODY resistance training  Goal protein intake of 60-80 grams per day    Calorie goal:   grams of protein, fiber 25-30 grams per day(Provided with meal plan to follow).    Return visit:  4 months   1) Patient has discontinued bupropion + naltrexone  2) Plan to start phentermine 15mg daily once we have EKG results  3) Referral to dietitian- patient is not interested in that right now    Nutrition RX:  - start tracking intake  - focus on protein- goal is 30 grams per meal; 90 grams per day  - focus on increasing fiber first by increasing vegetable servings per day  - do not skip breakfast and goal water intake 64 oz daily    Physical Activity RX:  - Goal is 150-200 mins of activity weekly.  Try to include at least 2 strength training sessions; increasing lean body mass will really help you to lose weight and maintain weight loss.      Total time spent reviewing chart, interviewing patient, examining patient, discussing plan, answering all questions, and documentin min, with >50% face-to-face time spent counseling patient on nonsurgical interventions for the treatment of excess weight. Discussed in detail nonsurgical options including intensive  lifestyle intervention program, very low-calorie diet program and conservative program.  Discussed the role of weight loss medications.  Counseled patient on diet behavior and exercise modification for weight loss.    Follow up in approximately3 months  with Non-Surgical Physician/Advanced Practitioner.    Subjective:   Chief Complaint   Patient presents with    Follow-up     2 MO MWM F/U   Waist - 39.5       Patient ID: Blanche Kennedy  is a 55 y.o. female with excess weight/obesity here to pursue weight management.  Previous notes and records have been reviewed.    Past Medical History:   Diagnosis Date    Colon polyp      Past Surgical History:   Procedure Laterality Date    BREAST BIOPSY Right     core biopsy-benign    COLONOSCOPY      HYSTERECTOMY  10/22/2013       HPI:  Wt Readings from Last 20 Encounters:   25 97.8 kg (215 lb 9.6 oz)   25 99.4 kg (219 lb 3.2 oz)   24 103 kg (226 lb 12.8 oz)   24 103 kg (227 lb 9.6 oz)   24 104 kg (229 lb)   24 104 kg (229 lb)   04/15/24 100 kg (221 lb)   24 100 kg (221 lb 3.2 oz)   24 97.5 kg (215 lb)   23 98.2 kg (216 lb 6.4 oz)   23 95.3 kg (210 lb)       Patient presents today to medical weight management office for follow up. She was taking bupropion + naltrexone but ended up stopping it a few weeks ago as she felt it was making her heart race. Patient feels very stressed with work, and her children graduating etc.     Physical Activity:  Walking, treadmill  Aerobic class 3x per week   [employer] videos   Goal of 10K steps per day     Diet: was doing well but then had to go to [place] for a  and kind of fell off of her plan again      Starting MWM weight: 219 lbs   Starting BMI: 35.3  Starting Waist Measurement: 40 inches   Goal weight: 150 lbs or 160 lbs     Obesity/Excess Weight:  Severity: Severe  Onset:  18 years ago     Modifiers: Diet and Exercise  Contributing factors: Poor Food Choices, Insufficient  Physical Activity, Stress/Emotional Eating, Binge Eating, Menopause, Lack of knowledge of appropriate lifestyle changes, and Insufficient time to make appropriate lifestyle changes  Associated symptoms: comorbid conditions, fatigue, increased joint pain, decreased exercise capacity, body image issues, decreased self esteem, decreased mobility, inability to do certain activities, and clothes do not fit    Diet recall:  B: Bananas, white bread or bagel and cc, coffee with creamer   S: Orange or banana   L: Romanian or [place]ian food or sushi, very carb heavy / pineapple juice or soda (regular)  S: Sweets or yogurt   D: Skip or cup of tea  Take out frequency: rare    Hydration: Water- not much maybe a bottle a day   Alcohol: no  Smoking: no   Exercise: Yes, aerobics and dance classes/ [employer]   Occupation: In office   Sleep: 4-5 hours - goes to bed late       The following portions of the patient's history were reviewed and updated as appropriate: allergies, current medications, past family history, past medical history, past social history, past surgical history, and problem list.    Family History   Problem Relation Age of Onset    Arthritis Mother     Hypertension Father     Asthma Father     Glaucoma Father     No Known Problems Sister     No Known Problems Sister     No Known Problems Daughter     No Known Problems Maternal Grandmother     No Known Problems Maternal Grandfather     No Known Problems Paternal Grandmother     No Known Problems Paternal Grandfather     No Known Problems Son     No Known Problems Maternal Aunt     No Known Problems Maternal Aunt     No Known Problems Maternal Aunt     No Known Problems Maternal Aunt     No Known Problems Paternal Aunt         Review of Systems   Constitutional:  Negative for fatigue.   HENT:  Negative for sore throat.    Respiratory:  Negative for cough and shortness of breath.    Cardiovascular:  Negative for chest pain, palpitations and leg swelling.   Gastrointestinal:  " Negative for abdominal pain, constipation, diarrhea, nausea and vomiting.   Genitourinary:  Negative for dysuria.   Musculoskeletal:  Negative for arthralgias and back pain.   Skin:  Negative for rash.   Neurological:  Negative for headaches.   Psychiatric/Behavioral:  Negative for dysphoric mood. The patient is not nervous/anxious.        Objective:  /72   Pulse 74   Temp 97.8 °F (36.6 °C)   Ht 5' 6\" (1.676 m)   Wt 97.8 kg (215 lb 9.6 oz)   SpO2 97%   BMI 34.80 kg/m²     Physical Exam  Vitals and nursing note reviewed.   Constitutional:       Appearance: Normal appearance. She is obese.   HENT:      Head: Normocephalic.   Pulmonary:      Effort: Pulmonary effort is normal.   Neurological:      Mental Status: She is oriented to person, place, and time.   Psychiatric:         Mood and Affect: Mood normal.         Behavior: Behavior normal.         Thought Content: Thought content normal.         Judgment: Judgment normal.            Labs and Imaging  Recent labs and imaging have been personally reviewed.  Lab Results   Component Value Date    WBC 7.62 07/23/2024    HGB 14.1 07/23/2024    HCT 44.0 07/23/2024    MCV 91 07/23/2024     07/23/2024     Lab Results   Component Value Date    SODIUM 137 10/03/2024    K 4.1 10/03/2024     10/03/2024    CO2 27 10/03/2024    AGAP 10 10/03/2024    BUN 10 10/03/2024    CREATININE 0.91 10/03/2024    GLUC 118 (H) 10/03/2024    GLUF 124 (H) 07/23/2024    CALCIUM 9.7 10/03/2024    AST 29 10/03/2024    ALT 24 10/03/2024    ALKPHOS 71 10/03/2024    TP 7.6 10/03/2024    TBILI 0.5 10/03/2024    EGFR 74 10/03/2024     No results found for: \"HGBA1C\"  Lab Results   Component Value Date    [id number] 3.538 07/23/2024     Lab Results   Component Value Date    CHOLESTEROL 227 (H) 07/23/2024     Lab Results   Component Value Date    HDL 60 07/23/2024     Lab Results   Component Value Date    TRIG 96 07/23/2024     Lab Results   Component Value Date    LDLCALC 148 (H) " 07/23/2024

## 2025-05-28 ENCOUNTER — APPOINTMENT (OUTPATIENT)
Dept: LAB | Facility: MEDICAL CENTER | Age: 55
End: 2025-05-28
Payer: COMMERCIAL

## 2025-05-28 DIAGNOSIS — E66.09 CLASS 2 OBESITY DUE TO EXCESS CALORIES WITHOUT SERIOUS COMORBIDITY WITH BODY MASS INDEX (BMI) OF 36.0 TO 36.9 IN ADULT: ICD-10-CM

## 2025-05-28 DIAGNOSIS — E66.812 CLASS 2 OBESITY DUE TO EXCESS CALORIES WITHOUT SERIOUS COMORBIDITY WITH BODY MASS INDEX (BMI) OF 36.0 TO 36.9 IN ADULT: ICD-10-CM

## 2025-05-28 LAB
ATRIAL RATE: 70 BPM
P AXIS: 42 DEGREES
PR INTERVAL: 142 MS
QRS AXIS: -11 DEGREES
QRSD INTERVAL: 92 MS
QT INTERVAL: 376 MS
QTC INTERVAL: 406 MS
T WAVE AXIS: 26 DEGREES
VENTRICULAR RATE: 70 BPM

## 2025-05-28 PROCEDURE — 93010 ELECTROCARDIOGRAM REPORT: CPT | Performed by: INTERNAL MEDICINE

## 2025-05-30 ENCOUNTER — RESULTS FOLLOW-UP (OUTPATIENT)
Age: 55
End: 2025-05-30

## 2025-05-30 DIAGNOSIS — E66.812 CLASS 2 OBESITY DUE TO EXCESS CALORIES WITHOUT SERIOUS COMORBIDITY WITH BODY MASS INDEX (BMI) OF 36.0 TO 36.9 IN ADULT: Primary | ICD-10-CM

## 2025-05-30 DIAGNOSIS — E66.09 CLASS 2 OBESITY DUE TO EXCESS CALORIES WITHOUT SERIOUS COMORBIDITY WITH BODY MASS INDEX (BMI) OF 36.0 TO 36.9 IN ADULT: Primary | ICD-10-CM

## 2025-05-30 RX ORDER — PHENTERMINE HYDROCHLORIDE 15 MG/1
15 CAPSULE ORAL EVERY MORNING
Qty: 30 CAPSULE | Refills: 1 | Status: SHIPPED | OUTPATIENT
Start: 2025-05-30